# Patient Record
Sex: FEMALE | Race: WHITE | NOT HISPANIC OR LATINO | Employment: OTHER | URBAN - NONMETROPOLITAN AREA
[De-identification: names, ages, dates, MRNs, and addresses within clinical notes are randomized per-mention and may not be internally consistent; named-entity substitution may affect disease eponyms.]

---

## 2019-06-24 NOTE — PATIENT INSTRUCTIONS
"We want your colonoscopy to be as pleasant as possible. Please review the instructions below. If you have questions, you may contact us at any of the following numbers:     [facility] Coordinator: [phone]  Clinic Nurse: [phone]  Surgery Education Nurse: [phone]    Date of Procedure: 6/28/19 with Dr. Woodard  Admit time: Surgery Department will call you the day before your procedure by 5pm with your admit time. If your surgery is on Monday, expect a call on Friday.  If you are not contacted by 5PM, please call admitting at [phone]. After hours or on weekends, call [phone] to postpone.     Call the surgery nurse if you become ill within 1 week of your procedure to reschedule.   Lab needed today.    7 DAYS BEFORE THE EXAM (start now):  Fill your prescription(s) at the pharmacy as soon as possible.  Call the Surgery Education Nurse at [phone] and have a medication list ready.  No Aspirin or NSAIDS (Ibuprofen, Celebrex, Naproxen, etc) 7 days before surgery.   Stop fiber supplements, vitamins, iron and herbals. Do not eat corn, nuts or seeds.  If you are prescribed a daily 81 mg Aspirin, you may continue this.   Arrange transportation with a responsible adult or you will be be cancelled.    2 DAYS BEFORE THE EXAM (6/26/19):   Follow a low fiber diet. See the list of low fiber foods on page 3 of the \"Split-Dose Golytely\" packet. Nothing red or purple. Drink 4-6 large glasses of sports drink today and tomorrow.          AT BEDTIME: take 2 Dulcolax tablets.     1 DAY BEFORE THE EXAM (6/27/19):  No solid foods or milk products after 12:01 am. Drink only clear liquids all day. See the list of clear liquids on page 2 of the \"Split-Dose Golytely\" packet. No red, purple, or alcohol.         AT 3:00 PM: Take 2 Dulcolax tablets.         AT 6:00 PM: Drink one 8 oz. glass of Golytely every 10-15 minutes until the jug is half empty. Drink each glass quickly. Store the rest in the refrigerator. Stay " near a toilet.    DAY OF COLONOSCOPY PROCEDURE (6/28/19):           6 HOURS PRIOR TO THE EXAM (set an alarm):  Drink the other half of the Golytely jug. Drink one 8 oz glass every 10-15 minutes until gone. You may have clear liquids up until 3 hours before your exam. If you must take medicine, you may take it with a sip of water.   Shower. Wear comfortable clothes. No jewelry, make-up, nail polish, hair spray, lotions, or perfumes. [facility] in Admitting through the [place] [facility].  You must have a responsible adult available to drive and stay with you for 4 hours at home or you will be cancelled.     TIPS FOR COLON CLEANSING BEFORE YOUR COLONOSCOPY  To get accurate results from your exam, your colon must be completely empty or you may need to repeat the colon prep and exam. If you followed instructions and your stool is clear or yellow liquid, you are ready. If you are not sure if your colon is clean, please call the clinic nurse.    You may use Tucks wipes, hemorrhoid treatments, hydrocortisone cream or alcohol-free baby wipes to ease anal irritation. You may also use Vaseline to help protect the skin.     You can squeeze some lemon juice or add a packet of Crystal Light lemon-lime or ice tea flavor into your preparation. You may try sucking on hard candy or a lemon or lime wedge; or washing your mouth out with water, clear soda or mouthwash.    To chill the solution, put it in your refrigerator or set it in a bowl of ice. Do not add ice in your glass. Remove from the refrigerator 15 minutes before drinking.    Quickly drink each glass. It may help to use a timer. If the liquid is too salty, use a straw. Even when you are sitting on the toilet, keep drinking every 10-15 minutes. If you have nausea or vomiting, take a break for 15 to 30 minutes and then resume drinking.    You will have loose watery stools and may also have chills. Dress for comfort. Expect to feel bloating, nausea and other discomfort until the  stool clears from your colon.

## 2019-06-25 ENCOUNTER — OFFICE VISIT (OUTPATIENT)
Dept: SURGERY | Facility: OTHER | Age: 79
End: 2019-06-25
Attending: SURGERY
Payer: MEDICARE

## 2019-06-25 VITALS
WEIGHT: 183 LBS | HEART RATE: 60 BPM | SYSTOLIC BLOOD PRESSURE: 116 MMHG | HEIGHT: 61 IN | OXYGEN SATURATION: 95 % | TEMPERATURE: 97.2 F | DIASTOLIC BLOOD PRESSURE: 72 MMHG | BODY MASS INDEX: 34.55 KG/M2

## 2019-06-25 DIAGNOSIS — Z12.11 SPECIAL SCREENING FOR MALIGNANT NEOPLASMS, COLON: Primary | ICD-10-CM

## 2019-06-25 DIAGNOSIS — I10 ESSENTIAL HYPERTENSION: ICD-10-CM

## 2019-06-25 PROBLEM — M85.80 OSTEOPENIA: Status: ACTIVE | Noted: 2019-06-25

## 2019-06-25 PROBLEM — M16.12 PRIMARY OSTEOARTHRITIS OF LEFT HIP: Status: ACTIVE | Noted: 2019-05-16

## 2019-06-25 PROBLEM — E66.811 OBESITY (BMI 30.0-34.9): Status: ACTIVE | Noted: 2017-05-08

## 2019-06-25 LAB
ANION GAP SERPL CALCULATED.3IONS-SCNC: 4 MMOL/L (ref 3–14)
BUN SERPL-MCNC: 18 MG/DL (ref 7–30)
CALCIUM SERPL-MCNC: 8.6 MG/DL (ref 8.5–10.1)
CHLORIDE SERPL-SCNC: 105 MMOL/L (ref 94–109)
CO2 SERPL-SCNC: 29 MMOL/L (ref 20–32)
CREAT SERPL-MCNC: 0.95 MG/DL (ref 0.52–1.04)
GFR SERPL CREATININE-BSD FRML MDRD: 57 ML/MIN/{1.73_M2}
GLUCOSE SERPL-MCNC: 104 MG/DL (ref 70–99)
POTASSIUM SERPL-SCNC: 4.1 MMOL/L (ref 3.4–5.3)
SODIUM SERPL-SCNC: 138 MMOL/L (ref 133–144)

## 2019-06-25 PROCEDURE — 80048 BASIC METABOLIC PNL TOTAL CA: CPT | Mod: ZL | Performed by: SURGERY

## 2019-06-25 PROCEDURE — G0463 HOSPITAL OUTPT CLINIC VISIT: HCPCS

## 2019-06-25 PROCEDURE — 99204 OFFICE O/P NEW MOD 45 MIN: CPT | Performed by: SURGERY

## 2019-06-25 PROCEDURE — 36415 COLL VENOUS BLD VENIPUNCTURE: CPT | Mod: ZL | Performed by: SURGERY

## 2019-06-25 PROCEDURE — 93010 ELECTROCARDIOGRAM REPORT: CPT | Performed by: INTERNAL MEDICINE

## 2019-06-25 PROCEDURE — G0463 HOSPITAL OUTPT CLINIC VISIT: HCPCS | Mod: 25

## 2019-06-25 PROCEDURE — 93005 ELECTROCARDIOGRAM TRACING: CPT

## 2019-06-25 RX ORDER — BISACODYL 5 MG/1
TABLET, DELAYED RELEASE ORAL
Qty: 4 TABLET | Refills: 0 | Status: ON HOLD | OUTPATIENT
Start: 2019-06-25 | End: 2019-06-28

## 2019-06-25 ASSESSMENT — PAIN SCALES - GENERAL: PAINLEVEL: MODERATE PAIN (5)

## 2019-06-25 ASSESSMENT — MIFFLIN-ST. JEOR: SCORE: 1234.52

## 2019-06-25 NOTE — NURSING NOTE
"Chief Complaint   Patient presents with     Consult For     screening colonoscopy. Referred by Dr. Lambert       Initial /72   Pulse 60   Temp 97.2  F (36.2  C)   Ht 1.537 m (5' 0.5\")   Wt 83 kg (183 lb)   SpO2 95%   BMI 35.15 kg/m   Estimated body mass index is 35.15 kg/m  as calculated from the following:    Height as of this encounter: 1.537 m (5' 0.5\").    Weight as of this encounter: 83 kg (183 lb).  Medication Reconciliation: complete    JULIANN HSIEH LPN         "

## 2019-06-25 NOTE — PROGRESS NOTES
[facility] Surgery Consultation    CC:  Colorectal cancer screening    HPI:  This 79 year old year old female is seen at the request of Rodrigo Lambert for evaluation of colorectal cancer screening.  The history is obtained from the patient, and reviewing the medical record.  She is good medical historian. She has undergone multiple previous colonoscopies. Per our records she has had a colonoscopy done in  and then again in  due to large tubular adenomas. She then underwent a repeat colonoscopy in  with Dr. Lambert with tubular adenomas noted. She has a family history of colon cancer with her sister who was diagnosed in her 60s. She has constipation that is controlled with prunes daily. She has had no melena or hematochezia. She has had no abdominal pain, bloating or cramping. She has no upper GI symptoms.     Past Medical History:   Diagnosis Date     PONV (postoperative nausea and vomiting)        Past Surgical History:   Procedure Laterality Date     COLONOSCOPY      Fausto; polyps     REPAIR HAMMER TOE Left 2016    Procedure: REPAIR HAMMER TOE;  Surgeon: Celso De La Cruz DPM;  Location: [facility]     REPAIR HAMMER TOE Right 2016    Procedure: REPAIR HAMMER TOE;  Surgeon: Celso De La Cruz DPM;  Location: [facility]       Family History   Problem Relation Age of Onset     Colon Cancer Sister         diagnosed at age 62     Leukemia Brother      Liver Cancer Brother      Bone Cancer Brother      Heart Disease Brother        Social History     Tobacco Use     Smoking status: Former Smoker     Types: Cigarettes     Last attempt to quit: 1989     Years since quittin.4     Smokeless tobacco: Former User   Substance Use Topics     Alcohol use: Not Currently     Drug use: Not Currently       Prior to Admission medications    Medication Sig Start Date End Date Taking? Authorizing Provider   AMLODIPINE BESYLATE PO Take 5 mg by mouth daily    Reported, Patient   ASPIRIN ADULT LOW STRENGTH PO  "Take 81 mg by mouth daily    Reported, Patient   ATORVASTATIN CALCIUM PO Take 10 mg by mouth daily    Reported, Patient   METOPROLOL TARTRATE PO Take 25 mg by mouth daily    Reported, Patient   oxybutynin (DITROPAN-XL) 10 MG 24 hr tablet Take by mouth daily    Reported, Patient       Pt denied problems with bleeding or anesthesia  No mood altering drug use.     No Known Allergies    REVIEW OF SYSTEMS:  Ten point review of systems negative except those mentioned in the HPI.     The patient denies sleep apnea, latex allergies or MRSA    OBJECTIVE:    /72   Pulse 60   Temp 97.2  F (36.2  C)   Ht 1.537 m (5' 0.5\")   Wt 83 kg (183 lb)   SpO2 95%   BMI 35.15 kg/m      GENERAL: Generally appears well, in no distress with appropriate affect.  HEENT:   Sclerae anicteric - No cervical, supra/infraclavicular lymphadenopathy  Respiratory:  Lungs clear to ausculation bilaterally with good air excursion  Cardiovascular:  Regular Rate and Rhythm with no murmurs gallops or rubs, normal   Abdomen: soft, non-tender, non-distended  :  deferred  Extremities:  Extremities normal. No deformities, edema, or skin discoloration.  Skin:  no suspicious lesions or rashes  Neurological: grossly intact  Psych:  Alert, oriented, affect appropriate with normal decision making ability.      IMPRESSION:  78 yo female for colorectal cancer screening    PLAN:  A detailed description of the [place] Preventive Task Force development of colorectal cancer screening was had. I described the pathology of the adenoma to carcinoma progression and its genetic changes that occur. I discussed how with colorectal cancer screening by endoscopic surveillance we are able to identify potential malignancies and remove them before they progress along the adenoma to carcinoma pathway. The risks for colon cancer progression were discussed including first degree relatives with colon cancer, inflammatory bowel disease, smoking, obesity, and diet. I then " discussed how there are certain attributes which can decrease the risk of colon cancer such as a healthy diet and physical activity. The patient understood the adenoma to carcinoma sequence, the reasoning for screening at specific intervals, and risk factor modification. I then described the technical portion of the procedure.    The indications, risks, benefits and technical aspects of whole colon colonoscopy were outlined with risks including, but not limited to, perforation, bleeding and inability to visualize entire colon.  Management of each was reviewed.  The need of mechanical preparation of the colon was reviewed along with the use of monitored anesthetic care.  The patient's questions were asked and answered.  Scheduled first available date.    Thank you for allowing me to participate in the care of your patient.       Wero Woodard MD    6/25/2019  1:16 PM    cc:  Rodrigo Lambert

## 2019-06-26 ENCOUNTER — ANESTHESIA EVENT (OUTPATIENT)
Dept: SURGERY | Facility: HOSPITAL | Age: 79
End: 2019-06-26
Payer: MEDICARE

## 2019-06-26 ASSESSMENT — LIFESTYLE VARIABLES: TOBACCO_USE: 1

## 2019-06-26 NOTE — ANESTHESIA PREPROCEDURE EVALUATION
Anesthesia Pre-Procedure Evaluation    Patient: Tenisha Nelson   MRN: [id number] : 1940          Preoperative Diagnosis: SCREENING    Procedure(s):  COLONOSCOPY    Past Medical History:   Diagnosis Date     PONV (postoperative nausea and vomiting)      Past Surgical History:   Procedure Laterality Date     COLONOSCOPY      Lambert; polyps     REPAIR HAMMER TOE Left 2016    Procedure: REPAIR HAMMER TOE;  Surgeon: Celso De La Cruz DPM;  Location: [facility]     REPAIR HAMMER TOE Right 2016    Procedure: REPAIR HAMMER TOE;  Surgeon: Celso De La Cruz DPM;  Location: [facility]       Anesthesia Evaluation     . Pt has had prior anesthetic.     History of anesthetic complications   - PONV        ROS/MED HX    ENT/Pulmonary:     (+)ILIA risk factors (BMI: 35.15) hypertension, obese, tobacco use, Past use , . .    Neurologic:  - neg neurologic ROS     Cardiovascular:     (+) Dyslipidemia, hypertension-range: beta blocker, ---. : . . . :. .       METS/Exercise Tolerance:     Hematologic:  - neg hematologic  ROS       Musculoskeletal:   (+) arthritis,  other musculoskeletal- acquired spondylolisthesis      GI/Hepatic:     (+) bowel prep,       Renal/Genitourinary:     (+) chronic renal disease (stage 3), type: CRI,       Endo:     (+) Obesity (BMI 35), .      Psychiatric:  - neg psychiatric ROS       Infectious Disease:  - neg infectious disease ROS       Malignancy:      - no malignancy   Other:    - neg other ROS                      Physical Exam      Airway   Mallampati: II  TM distance: >3 FB  Neck ROM: full    Dental   (+) chipped and missing    Cardiovascular   Rhythm and rate: regular and normal      Pulmonary    breath sounds clear to auscultation            Lab Results   Component Value Date     2019    POTASSIUM 4.1 2019    CHLORIDE 105 2019    CO2 29 2019    BUN 18 2019    CR 0.95 2019     (H) 2019    RODOLFO 8.6 2019       Preop Vitals  BP Readings  "from Last 3 Encounters:   06/25/19 116/72   08/01/16 123/72   05/09/16 (!) 152/111    Pulse Readings from Last 3 Encounters:   06/25/19 60   08/01/16 72      Resp Readings from Last 3 Encounters:   08/01/16 18   05/09/16 16    SpO2 Readings from Last 3 Encounters:   06/25/19 95%   08/01/16 95%   05/09/16 96%      Temp Readings from Last 1 Encounters:   06/25/19 97.2  F (36.2  C)    Ht Readings from Last 1 Encounters:   06/25/19 1.537 m (5' 0.5\")      Wt Readings from Last 1 Encounters:   06/25/19 83 kg (183 lb)    Estimated body mass index is 35.15 kg/m  as calculated from the following:    Height as of 6/25/19: 1.537 m (5' 0.5\").    Weight as of 6/25/19: 83 kg (183 lb).       Anesthesia Plan      History & Physical Review  History and physical reviewed and following examination; no interval change.    ASA Status:  3 .    NPO Status:  > 8 hours    Plan for MAC with Intravenous and Propofol induction. Maintenance will be TIVA.  Reason for MAC:  Chronic cardiopulmonary disease (G9) and Other - see comments  PONV prophylaxis:  Ondansetron (or other 5HT-3)  Consult note Yamilet 6/25  Surgeon requests deep sedation. Patient is an ASA 3 and has advanced age >70. Will provide MAC.      Postoperative Care  Postoperative pain management:  IV analgesics.      Consents  Anesthetic plan, risks, benefits and alternatives discussed with:  Patient..                 RICKEY Tucker CRNA  "

## 2019-06-28 ENCOUNTER — ANESTHESIA (OUTPATIENT)
Dept: SURGERY | Facility: HOSPITAL | Age: 79
End: 2019-06-28
Payer: MEDICARE

## 2019-06-28 ENCOUNTER — HOSPITAL ENCOUNTER (OUTPATIENT)
Facility: HOSPITAL | Age: 79
Discharge: HOME | End: 2019-06-28
Attending: SURGERY | Admitting: SURGERY
Payer: MEDICARE

## 2019-06-28 VITALS — SYSTOLIC BLOOD PRESSURE: 113 MMHG | TEMPERATURE: 97.4 F | DIASTOLIC BLOOD PRESSURE: 68 MMHG | OXYGEN SATURATION: 94 %

## 2019-06-28 PROCEDURE — 45385 COLONOSCOPY W/LESION REMOVAL: CPT | Mod: PT | Performed by: SURGERY

## 2019-06-28 PROCEDURE — 71000027 ZZH RECOVERY PHASE 2 EACH 15 MINS: Performed by: SURGERY

## 2019-06-28 PROCEDURE — 40000306 ZZH STATISTIC PRE PROC ASSESS II: Performed by: SURGERY

## 2019-06-28 PROCEDURE — 88305 TISSUE EXAM BY PATHOLOGIST: CPT | Mod: TC | Performed by: SURGERY

## 2019-06-28 PROCEDURE — 25000125 ZZHC RX 250: Performed by: NURSE ANESTHETIST, CERTIFIED REGISTERED

## 2019-06-28 PROCEDURE — 37000008 ZZH ANESTHESIA TECHNICAL FEE, 1ST 30 MIN: Performed by: SURGERY

## 2019-06-28 PROCEDURE — 99100 ANES PT EXTEME AGE<1 YR&>70: CPT | Performed by: NURSE ANESTHETIST, CERTIFIED REGISTERED

## 2019-06-28 PROCEDURE — 36000050 ZZH SURGERY LEVEL 2 1ST 30 MIN: Performed by: SURGERY

## 2019-06-28 PROCEDURE — 25800030 ZZH RX IP 258 OP 636: Performed by: NURSE ANESTHETIST, CERTIFIED REGISTERED

## 2019-06-28 PROCEDURE — 27210794 ZZH OR GENERAL SUPPLY STERILE: Performed by: SURGERY

## 2019-06-28 PROCEDURE — 45385 COLONOSCOPY W/LESION REMOVAL: CPT | Performed by: ANESTHESIOLOGY

## 2019-06-28 PROCEDURE — 25000128 H RX IP 250 OP 636: Performed by: NURSE ANESTHETIST, CERTIFIED REGISTERED

## 2019-06-28 PROCEDURE — 45385 COLONOSCOPY W/LESION REMOVAL: CPT | Performed by: NURSE ANESTHETIST, CERTIFIED REGISTERED

## 2019-06-28 RX ORDER — ONDANSETRON 2 MG/ML
4 INJECTION INTRAMUSCULAR; INTRAVENOUS EVERY 30 MIN PRN
Status: DISCONTINUED | OUTPATIENT
Start: 2019-06-28 | End: 2019-06-28 | Stop reason: HOSPADM

## 2019-06-28 RX ORDER — FLUMAZENIL 0.1 MG/ML
0.2 INJECTION, SOLUTION INTRAVENOUS
Status: DISCONTINUED | OUTPATIENT
Start: 2019-06-28 | End: 2019-06-28 | Stop reason: HOSPADM

## 2019-06-28 RX ORDER — LIDOCAINE 40 MG/G
CREAM TOPICAL
Status: DISCONTINUED | OUTPATIENT
Start: 2019-06-28 | End: 2019-06-28 | Stop reason: HOSPADM

## 2019-06-28 RX ORDER — SODIUM CHLORIDE, SODIUM LACTATE, POTASSIUM CHLORIDE, CALCIUM CHLORIDE 600; 310; 30; 20 MG/100ML; MG/100ML; MG/100ML; MG/100ML
INJECTION, SOLUTION INTRAVENOUS CONTINUOUS
Status: DISCONTINUED | OUTPATIENT
Start: 2019-06-28 | End: 2019-06-28 | Stop reason: HOSPADM

## 2019-06-28 RX ORDER — NALOXONE HYDROCHLORIDE 0.4 MG/ML
.1-.4 INJECTION, SOLUTION INTRAMUSCULAR; INTRAVENOUS; SUBCUTANEOUS
Status: DISCONTINUED | OUTPATIENT
Start: 2019-06-28 | End: 2019-06-28 | Stop reason: HOSPADM

## 2019-06-28 RX ORDER — LIDOCAINE HYDROCHLORIDE 20 MG/ML
INJECTION, SOLUTION INFILTRATION; PERINEURAL PRN
Status: DISCONTINUED | OUTPATIENT
Start: 2019-06-28 | End: 2019-06-28

## 2019-06-28 RX ORDER — MEPERIDINE HYDROCHLORIDE 50 MG/ML
12.5 INJECTION INTRAMUSCULAR; INTRAVENOUS; SUBCUTANEOUS
Status: DISCONTINUED | OUTPATIENT
Start: 2019-06-28 | End: 2019-06-28 | Stop reason: HOSPADM

## 2019-06-28 RX ORDER — ONDANSETRON 4 MG/1
4 TABLET, ORALLY DISINTEGRATING ORAL EVERY 30 MIN PRN
Status: DISCONTINUED | OUTPATIENT
Start: 2019-06-28 | End: 2019-06-28 | Stop reason: HOSPADM

## 2019-06-28 RX ORDER — PROPOFOL 10 MG/ML
INJECTION, EMULSION INTRAVENOUS PRN
Status: DISCONTINUED | OUTPATIENT
Start: 2019-06-28 | End: 2019-06-28

## 2019-06-28 RX ADMIN — PROPOFOL 30 MG: 10 INJECTION, EMULSION INTRAVENOUS at 08:43

## 2019-06-28 RX ADMIN — PROPOFOL 30 MG: 10 INJECTION, EMULSION INTRAVENOUS at 08:45

## 2019-06-28 RX ADMIN — PROPOFOL 30 MG: 10 INJECTION, EMULSION INTRAVENOUS at 08:34

## 2019-06-28 RX ADMIN — LIDOCAINE HYDROCHLORIDE 40 MG: 20 INJECTION, SOLUTION INFILTRATION; PERINEURAL at 08:34

## 2019-06-28 RX ADMIN — PROPOFOL 20 MG: 10 INJECTION, EMULSION INTRAVENOUS at 08:48

## 2019-06-28 RX ADMIN — SODIUM CHLORIDE, POTASSIUM CHLORIDE, SODIUM LACTATE AND CALCIUM CHLORIDE: 600; 310; 30; 20 INJECTION, SOLUTION INTRAVENOUS at 08:00

## 2019-06-28 RX ADMIN — PROPOFOL 30 MG: 10 INJECTION, EMULSION INTRAVENOUS at 08:40

## 2019-06-28 RX ADMIN — PROPOFOL 30 MG: 10 INJECTION, EMULSION INTRAVENOUS at 08:36

## 2019-06-28 RX ADMIN — PROPOFOL 20 MG: 10 INJECTION, EMULSION INTRAVENOUS at 08:55

## 2019-06-28 RX ADMIN — PROPOFOL 20 MG: 10 INJECTION, EMULSION INTRAVENOUS at 08:52

## 2019-06-28 NOTE — OP NOTE
Tenisha Nelson MRN# [id number]   YOB: 1940 Age: 79 year old      Date of Admission:  6/28/2019    Primary care provider: Rodrigo Lambert    PREOPERATIVE DIAGNOSIS:  Screening colonoscopy.         POSTOPERATIVE DIAGNOSIS:  Transverse colon polyp x 2         PROCEDURE:  Whole colon colonoscopy with hot snare polypectomy.         INDICATIONS:  This 79 year old female presents for interval screening colonoscopy.        OPERATIVE FINDINGS:  There were two polyps identified in the transverse colon.          DESCRIPTION OF PROCEDURE:  With the patient in the supine position on the transport cart, IV sedation was administered by the nurse anesthetist.  Her correct identity and planned procedure were confirmed during the requisite timeout pause and he was rolled to the left lateral position.  The anus was digitally dilated and the fiberoptic colonoscope was introduced and negotiated through the length of the colon to the cecal base.  The cecum was intubated and its landmarks clearly identified.  A circumferential examination of the mucosa on introduction of the colonoscope and on its slow withdrawal confirmed the absence of neoplasia, inflammation and/or stricture.  In the transverse colon there were two polyps identified. These were removed with hot snare polypectomy and retrieved. The areas were inspected and hemostasis maintained. There were scattered diverticuli noted.  Retroflex in the rectal ampulla showed no evidence of pathology.  Air was aspirated and the colonoscope was withdrawn; the patient was returned to day surgery in good condition, without suggestion of complication.   The post surgical debriefing was held and acknowledged at completion.          Wero Woodard MD     6/28/2019 9:00 AM

## 2019-06-28 NOTE — ANESTHESIA POSTPROCEDURE EVALUATION
Patient: Tenisha Nelson    Procedure(s):  COLONOSCOPY with Polypectomy    Diagnosis:SCREENING  Diagnosis Additional Information: No value filed.    Anesthesia Type:  MAC    Note:  Anesthesia Post Evaluation    Patient location during evaluation: Phase 2 and Bedside  Patient participation: Able to fully participate in evaluation  Level of consciousness: awake and alert  Pain management: adequate  Airway patency: patent  Cardiovascular status: acceptable  Respiratory status: acceptable  Hydration status: stable  PONV: none     Anesthetic complications: None          Last vitals:  Vitals:    06/28/19 0915 06/28/19 0920 06/28/19 0925   BP: 99/64 (!) 88/61 113/68   Temp:      SpO2: 94% 93% 94%         Electronically Signed By: Stalin Dowling MD  June 28, 2019  9:32 AM

## 2019-06-28 NOTE — DISCHARGE INSTRUCTIONS

## 2019-06-28 NOTE — ANESTHESIA CARE TRANSFER NOTE
Patient: Tenisha Nelson    Procedure(s):  COLONOSCOPY with Polypectomy    Diagnosis: SCREENING  Diagnosis Additional Information: No value filed.    Anesthesia Type:   MAC     Note:  Airway :Nasal Cannula  Patient transferred to:Phase II  Handoff Report: Identifed the Patient, Identified the Reponsible Provider, Reviewed the pertinent medical history, Discussed the surgical course, Reviewed Intra-OP anesthesia mangement and issues during anesthesia, Set expectations for post-procedure period and Allowed opportunity for questions and acknowledgement of understanding      Vitals: (Last set prior to Anesthesia Care Transfer)    CRNA VITALS  6/28/2019 0829 - 6/28/2019 0902      6/28/2019             Pulse:  69    Ht Rate:  67    SpO2:  92 %    Resp Rate (set):  8                Electronically Signed By: RICKEY Galan CRNA  June 28, 2019  9:02 AM

## 2019-07-01 LAB — COPATH REPORT: NORMAL

## 2020-02-25 DIAGNOSIS — H90.3 BILATERAL SENSORINEURAL HEARING LOSS: Primary | ICD-10-CM

## 2020-03-10 ENCOUNTER — OFFICE VISIT (OUTPATIENT)
Dept: OTOLARYNGOLOGY | Facility: OTHER | Age: 80
End: 2020-03-10
Attending: NURSE PRACTITIONER
Payer: MEDICARE

## 2020-03-10 ENCOUNTER — OFFICE VISIT (OUTPATIENT)
Dept: AUDIOLOGY | Facility: OTHER | Age: 80
End: 2020-03-10
Attending: AUDIOLOGIST
Payer: MEDICARE

## 2020-03-10 VITALS
HEART RATE: 58 BPM | RESPIRATION RATE: 18 BRPM | TEMPERATURE: 97.5 F | DIASTOLIC BLOOD PRESSURE: 54 MMHG | WEIGHT: 181 LBS | HEIGHT: 61 IN | BODY MASS INDEX: 34.17 KG/M2 | SYSTOLIC BLOOD PRESSURE: 124 MMHG | OXYGEN SATURATION: 95 %

## 2020-03-10 DIAGNOSIS — H90.3 SENSORINEURAL HEARING LOSS (SNHL) OF BOTH EARS: Primary | ICD-10-CM

## 2020-03-10 DIAGNOSIS — H90.3 BILATERAL SENSORINEURAL HEARING LOSS: ICD-10-CM

## 2020-03-10 DIAGNOSIS — H93.13 TINNITUS, BILATERAL: ICD-10-CM

## 2020-03-10 DIAGNOSIS — H90.3 ASNHL (ASYMMETRICAL SENSORINEURAL HEARING LOSS): Primary | ICD-10-CM

## 2020-03-10 PROCEDURE — 92550 TYMPANOMETRY & REFLEX THRESH: CPT | Performed by: AUDIOLOGIST

## 2020-03-10 PROCEDURE — G0463 HOSPITAL OUTPT CLINIC VISIT: HCPCS | Mod: 25

## 2020-03-10 PROCEDURE — 99213 OFFICE O/P EST LOW 20 MIN: CPT | Performed by: NURSE PRACTITIONER

## 2020-03-10 PROCEDURE — 92557 COMPREHENSIVE HEARING TEST: CPT | Performed by: AUDIOLOGIST

## 2020-03-10 ASSESSMENT — PAIN SCALES - GENERAL: PAINLEVEL: NO PAIN (0)

## 2020-03-10 ASSESSMENT — MIFFLIN-ST. JEOR: SCORE: 1233.39

## 2020-03-10 NOTE — PROGRESS NOTES
Audiology Evaluation Completed. Please refer SCANNED AUDIOGRAM and/or TYMPANOGRAM for BACKGROUND, RESULTS, RECOMMENDATIONS.      Jojo HAMEED, [facility]  Audiologist #[id number]

## 2020-03-10 NOTE — PROGRESS NOTES
Otolaryngology Note         Chief Complaint:     Patient presents with:  Tinnitus: Patient  here for ringing in ears           History of Present Illness:     Tenisha Nelson is a 79 year old female who presents today with a chief complaint of tinnitus.  She noticed it last fall 2019.  It came on gradually.  She notes it is all the time, louder at night.  It is not bothersome, does not keep her awake at night.  It is not pulsatile in nature.      She denies concerns for vertigo/dizziness  No fluctuating hearing loss  No history of trauma/head injury  No history of noise exposure  She has a family history of hearing loss/tinnitus    No change in medications  She denies  history of systemic diseases  No current/recurrent history of infections  No history of otological surgery  No current otalgia/ottorhea  No jaw pain/TMJ  Baby ASA daily no other ASA or NSAIDS  3-4 cups coffee in morning, occasional in the afternoon.    No ETOH  No tobacco, quit smoking about 30 years ago  No significant stress    Audiogram 3/10/2020  Tympanograms are Type A for both ears suggesting normal eardrum mobility.  Acoustic Reflex Thresholds at 1000 Hz are present for both ears.  Thresholds are normal to severe sensorineuralhearing loss with slight asymmetry.  Speech reception thresholds are in good agreement with pure tone average.  Word discrimination scores are excellent at supra-thresholds level/MCL.         Medications:     Current Outpatient Rx   Medication Sig Dispense Refill     AMLODIPINE BESYLATE PO Take 5 mg by mouth daily       ASPIRIN ADULT LOW STRENGTH PO Take 81 mg by mouth daily       ATORVASTATIN CALCIUM PO Take 10 mg by mouth daily       METOPROLOL TARTRATE PO Take 25 mg by mouth daily       oxybutynin (DITROPAN-XL) 10 MG 24 hr tablet Take by mouth daily              Allergies:     Allergies: Patient has no known allergies.          Past Medical History:     Past Medical History:   Diagnosis Date     PONV (postoperative  "nausea and vomiting)             Past Surgical History:     Past Surgical History:   Procedure Laterality Date     COLONOSCOPY      Lambert; polyps     COLONOSCOPY N/A 2019    Procedure: COLONOSCOPY with Polypectomy;  Surgeon: Wero Woodard MD;  Location: [facility]     REPAIR HAMMER TOE Left 2016    Procedure: REPAIR HAMMER TOE;  Surgeon: Celso De La Cruz DPM;  Location: [facility]     REPAIR HAMMER TOE Right 2016    Procedure: REPAIR HAMMER TOE;  Surgeon: Celso De La Cruz DPM;  Location: [facility]       ENT family history reviewed         Social History:     Social History     Tobacco Use     Smoking status: Former Smoker     Types: Cigarettes     Last attempt to quit: 1989     Years since quittin.2     Smokeless tobacco: Former User   Substance Use Topics     Alcohol use: Not Currently     Drug use: Not Currently            Review of Systems:     ROS: See HPI         Physical Exam:     /54 (BP Location: Right arm)   Pulse 58   Temp 97.5  F (36.4  C) (Tympanic)   Resp 18   Ht 1.549 m (5' 1\")   Wt 82.1 kg (181 lb)   SpO2 95%   BMI 34.20 kg/m      General - The patient is well nourished and well developed, and appears to have good nutritional status.  Alert and oriented to person and place, answers questions and cooperates with examination appropriately.   Head and Face - Normocephalic and atraumatic, with no gross asymmetry noted.  The facial nerve is intact, with strong symmetric movements.  Voice and Breathing - The patient was breathing comfortably without the use of accessory muscles. There was no wheezing, stridor. The patients voice was clear and strong, and had appropriate pitch and quality.  Ears - External ear normal. Canals are patent. Right tympanic membrane is intact without effusion, retraction or mass. Left tympanic membrane is intact without effusion, retraction or mass.  Eyes - Extraocular movements intact, and the pupils were reactive to light. Sclera were not icteric " or injected, conjunctiva were pink and moist.  Mouth - Examination of the oral cavity showed pink, healthy oral mucosa. Dentition in good condition. No lesions or ulcerations noted. The tongue was mobile and midline.   Throat - The walls of the oropharynx were smooth, pink, moist, symmetric, and had no lesions or ulcerations.  The tonsillar pillars and soft palate were symmetric. The uvula was midline on elevation.    Neck - Normal midline excursion of the laryngotracheal complex during swallowing.  Full range of motion on passive movement.  Palpation of the occipital, submental, submandibular, internal jugular chain, and supraclavicular nodes did not demonstrate any abnormal lymph nodes or masses.  Palpation of the thyroid was soft and smooth, with no nodules or goiter appreciated.  The trachea was mobile and midline.  Nose - External contour is symmetric, no gross deflection or scars.  Nasal mucosa is pink and moist with no abnormal mucus.  The septum and turbinates were evaluated: grossly normal.  No polyps, masses, or purulence noted on examination.         Assessment and Plan:       ICD-10-CM    1. ASNHL (asymmetrical sensorineural hearing loss)  H90.5 MR Internal Auditory Canal wo&w Contrast   2. Tinnitus, bilateral  H93.13 MR Internal Auditory Canal wo&w Contrast       We spent the remainder of today's visit discussing the current leading theory on tinnitus, in that it originates from the central nervous system itself, similar to Phantom Limb Syndrome.  Also discussed were steps that can be taken to mask the noise, such as a low volume de-tuned radio, a fan in the background, and hearing aids.  Correlation with stress, anxiety, depression, and high blood pressure were also discussed.  The patient was also cautioned on the numerous expensive non-pharmaceutical options that are advertised, and have no proven benefit.    The patient will follow up as necessary for worsening symptoms, changes in symptoms, or  signs of infection.  I have also recommended yearly audiograms, and consideration of a hearing aid evaluation.    MRI IAC    Li GARCÍA  [facility]  12:45 PM  March 10, 2020

## 2020-03-10 NOTE — NURSING NOTE
"Chief Complaint   Patient presents with     Tinnitus     Patient  here for ringing in ears, audio done 2/25/2020       Initial /54 (BP Location: Right arm)   Pulse 58   Temp 97.5  F (36.4  C) (Tympanic)   Resp 18   Ht 1.549 m (5' 1\")   Wt 82.1 kg (181 lb)   SpO2 95%   BMI 34.20 kg/m   Estimated body mass index is 34.2 kg/m  as calculated from the following:    Height as of this encounter: 1.549 m (5' 1\").    Weight as of this encounter: 82.1 kg (181 lb).  Medication Reconciliation: complete  Jocelyne Burnett LPN  "

## 2020-03-10 NOTE — PATIENT INSTRUCTIONS
F/u with MRI        Thank you for allowing Li Blair NP and our ENT team to participate in your care.  If your medications are too expensive, please give the nurse a call.  We can possibly change this medication.  If you have a scheduling or an appointment question please contact our Health Unit Coordinator at their direct line [phone].   ALL nursing questions or concerns can be directed to your ENT nurse at: [phone] Dawood Casanova

## 2020-03-10 NOTE — LETTER
3/10/2020         RE: Tenisha Nelson  [address]  [address]  [facility] [address]        Dear Colleague,    Thank you for referring your patient, Tenisha Nelson, to the [facility]. Please see a copy of my visit note below.      Otolaryngology Note         Chief Complaint:     Patient presents with:  Tinnitus: Patient  here for ringing in ears           History of Present Illness:     Tenisha Nelson is a 79 year old female who presents today with a chief complaint of tinnitus.  She noticed it last fall 2019.  It came on gradually.  She notes it is all the time, louder at night.  It is not bothersome, does not keep her awake at night.  It is not pulsatile in nature.      She denies concerns for vertigo/dizziness  No fluctuating hearing loss  No history of trauma/head injury  No history of noise exposure  She has a family history of hearing loss/tinnitus    No change in medications  She denies  history of systemic diseases  No current/recurrent history of infections  No history of otological surgery  No current otalgia/ottorhea  No jaw pain/TMJ  Baby ASA daily no other ASA or NSAIDS  3-4 cups coffee in morning, occasional in the afternoon.    No ETOH  No tobacco, quit smoking about 30 years ago  No significant stress    Audiogram 3/10/2020  Tympanograms are Type A for both ears suggesting normal eardrum mobility.  Acoustic Reflex Thresholds at 1000 Hz are present for both ears.  Thresholds are normal to severe sensorineuralhearing loss with slight asymmetry.  Speech reception thresholds are in good agreement with pure tone average.  Word discrimination scores are excellent at supra-thresholds level/MCL.         Medications:     Current Outpatient Rx   Medication Sig Dispense Refill     AMLODIPINE BESYLATE PO Take 5 mg by mouth daily       ASPIRIN ADULT LOW STRENGTH PO Take 81 mg by mouth daily       ATORVASTATIN CALCIUM PO Take 10 mg by mouth daily       METOPROLOL TARTRATE PO Take 25 mg by  "mouth daily       oxybutynin (DITROPAN-XL) 10 MG 24 hr tablet Take by mouth daily              Allergies:     Allergies: Patient has no known allergies.          Past Medical History:     Past Medical History:   Diagnosis Date     PONV (postoperative nausea and vomiting)             Past Surgical History:     Past Surgical History:   Procedure Laterality Date     COLONOSCOPY      Lambert; polyps     COLONOSCOPY N/A 2019    Procedure: COLONOSCOPY with Polypectomy;  Surgeon: Wero Woodard MD;  Location: [facility]     REPAIR HAMMER TOE Left 2016    Procedure: REPAIR HAMMER TOE;  Surgeon: Celso De La Cruz DPM;  Location: [facility]     REPAIR HAMMER TOE Right 2016    Procedure: REPAIR HAMMER TOE;  Surgeon: Celso De La Cruz DPM;  Location: [facility]       ENT family history reviewed         Social History:     Social History     Tobacco Use     Smoking status: Former Smoker     Types: Cigarettes     Last attempt to quit: 1989     Years since quittin.2     Smokeless tobacco: Former User   Substance Use Topics     Alcohol use: Not Currently     Drug use: Not Currently            Review of Systems:     ROS: See HPI         Physical Exam:     /54 (BP Location: Right arm)   Pulse 58   Temp 97.5  F (36.4  C) (Tympanic)   Resp 18   Ht 1.549 m (5' 1\")   Wt 82.1 kg (181 lb)   SpO2 95%   BMI 34.20 kg/m      General - The patient is well nourished and well developed, and appears to have good nutritional status.  Alert and oriented to person and place, answers questions and cooperates with examination appropriately.   Head and Face - Normocephalic and atraumatic, with no gross asymmetry noted.  The facial nerve is intact, with strong symmetric movements.  Voice and Breathing - The patient was breathing comfortably without the use of accessory muscles. There was no wheezing, stridor. The patients voice was clear and strong, and had appropriate pitch and quality.  Ears - External ear normal. Canals are " patent. Right tympanic membrane is intact without effusion, retraction or mass. Left tympanic membrane is intact without effusion, retraction or mass.  Eyes - Extraocular movements intact, and the pupils were reactive to light. Sclera were not icteric or injected, conjunctiva were pink and moist.  Mouth - Examination of the oral cavity showed pink, healthy oral mucosa. Dentition in good condition. No lesions or ulcerations noted. The tongue was mobile and midline.   Throat - The walls of the oropharynx were smooth, pink, moist, symmetric, and had no lesions or ulcerations.  The tonsillar pillars and soft palate were symmetric. The uvula was midline on elevation.    Neck - Normal midline excursion of the laryngotracheal complex during swallowing.  Full range of motion on passive movement.  Palpation of the occipital, submental, submandibular, internal jugular chain, and supraclavicular nodes did not demonstrate any abnormal lymph nodes or masses.  Palpation of the thyroid was soft and smooth, with no nodules or goiter appreciated.  The trachea was mobile and midline.  Nose - External contour is symmetric, no gross deflection or scars.  Nasal mucosa is pink and moist with no abnormal mucus.  The septum and turbinates were evaluated: grossly normal.  No polyps, masses, or purulence noted on examination.         Assessment and Plan:       ICD-10-CM    1. ASNHL (asymmetrical sensorineural hearing loss)  H90.5 MR Internal Auditory Canal wo&w Contrast   2. Tinnitus, bilateral  H93.13 MR Internal Auditory Canal wo&w Contrast       We spent the remainder of today's visit discussing the current leading theory on tinnitus, in that it originates from the central nervous system itself, similar to Phantom Limb Syndrome.  Also discussed were steps that can be taken to mask the noise, such as a low volume de-tuned radio, a fan in the background, and hearing aids.  Correlation with stress, anxiety, depression, and high blood  pressure were also discussed.  The patient was also cautioned on the numerous expensive non-pharmaceutical options that are advertised, and have no proven benefit.    The patient will follow up as necessary for worsening symptoms, changes in symptoms, or signs of infection.  I have also recommended yearly audiograms, and consideration of a hearing aid evaluation.    MRI IAC    Li GARCÍA  [facility]  12:45 PM  March 10, 2020      Again, thank you for allowing me to participate in the care of your patient.        Sincerely,        Li Blair NP

## 2021-02-10 ENCOUNTER — IMMUNIZATION (OUTPATIENT)
Dept: FAMILY MEDICINE | Facility: OTHER | Age: 81
End: 2021-02-10
Payer: MEDICARE

## 2021-02-10 PROCEDURE — 91300 PR COVID VAC PFIZER DIL RECON 30 MCG/0.3 ML IM: CPT

## 2021-03-03 ENCOUNTER — IMMUNIZATION (OUTPATIENT)
Dept: FAMILY MEDICINE | Facility: OTHER | Age: 81
End: 2021-03-03
Attending: FAMILY MEDICINE
Payer: MEDICARE

## 2021-03-03 PROCEDURE — 91300 PR COVID VAC PFIZER DIL RECON 30 MCG/0.3 ML IM: CPT

## 2021-06-04 ENCOUNTER — APPOINTMENT (OUTPATIENT)
Dept: CT IMAGING | Facility: HOSPITAL | Age: 81
End: 2021-06-04
Attending: INTERNAL MEDICINE
Payer: MEDICARE

## 2021-06-04 ENCOUNTER — HOSPITAL ENCOUNTER (EMERGENCY)
Facility: HOSPITAL | Age: 81
Discharge: HOME | End: 2021-06-04
Attending: INTERNAL MEDICINE | Admitting: INTERNAL MEDICINE
Payer: MEDICARE

## 2021-06-04 ENCOUNTER — APPOINTMENT (OUTPATIENT)
Dept: GENERAL RADIOLOGY | Facility: HOSPITAL | Age: 81
End: 2021-06-04
Attending: INTERNAL MEDICINE
Payer: MEDICARE

## 2021-06-04 VITALS
OXYGEN SATURATION: 93 % | SYSTOLIC BLOOD PRESSURE: 117 MMHG | HEART RATE: 94 BPM | RESPIRATION RATE: 20 BRPM | DIASTOLIC BLOOD PRESSURE: 73 MMHG | TEMPERATURE: 99.1 F

## 2021-06-04 DIAGNOSIS — R07.9 CHEST PAIN, UNSPECIFIED TYPE: ICD-10-CM

## 2021-06-04 DIAGNOSIS — M54.2 NECK PAIN: ICD-10-CM

## 2021-06-04 LAB
ALBUMIN SERPL-MCNC: 3.2 G/DL (ref 3.4–5)
ALP SERPL-CCNC: 69 U/L (ref 40–150)
ALT SERPL W P-5'-P-CCNC: 18 U/L (ref 0–50)
ANION GAP SERPL CALCULATED.3IONS-SCNC: 7 MMOL/L (ref 3–14)
AST SERPL W P-5'-P-CCNC: 17 U/L (ref 0–45)
BASOPHILS # BLD AUTO: 0 10E9/L (ref 0–0.2)
BASOPHILS NFR BLD AUTO: 0.2 %
BILIRUB SERPL-MCNC: 0.5 MG/DL (ref 0.2–1.3)
BUN SERPL-MCNC: 14 MG/DL (ref 7–30)
CALCIUM SERPL-MCNC: 8.5 MG/DL (ref 8.5–10.1)
CHLORIDE SERPL-SCNC: 105 MMOL/L (ref 94–109)
CO2 SERPL-SCNC: 27 MMOL/L (ref 20–32)
CREAT SERPL-MCNC: 0.88 MG/DL (ref 0.52–1.04)
DIFFERENTIAL METHOD BLD: ABNORMAL
EOSINOPHIL # BLD AUTO: 0.1 10E9/L (ref 0–0.7)
EOSINOPHIL NFR BLD AUTO: 1 %
ERYTHROCYTE [DISTWIDTH] IN BLOOD BY AUTOMATED COUNT: 12.4 % (ref 10–15)
GFR SERPL CREATININE-BSD FRML MDRD: 62 ML/MIN/{1.73_M2}
GLUCOSE SERPL-MCNC: 144 MG/DL (ref 70–99)
HCT VFR BLD AUTO: 38.2 % (ref 35–47)
HGB BLD-MCNC: 12.8 G/DL (ref 11.7–15.7)
IMM GRANULOCYTES # BLD: 0 10E9/L (ref 0–0.4)
IMM GRANULOCYTES NFR BLD: 0.3 %
LYMPHOCYTES # BLD AUTO: 1.2 10E9/L (ref 0.8–5.3)
LYMPHOCYTES NFR BLD AUTO: 10.2 %
MCH RBC QN AUTO: 32.6 PG (ref 26.5–33)
MCHC RBC AUTO-ENTMCNC: 33.5 G/DL (ref 31.5–36.5)
MCV RBC AUTO: 97 FL (ref 78–100)
MONOCYTES # BLD AUTO: 0.7 10E9/L (ref 0–1.3)
MONOCYTES NFR BLD AUTO: 6.4 %
NEUTROPHILS # BLD AUTO: 9.2 10E9/L (ref 1.6–8.3)
NEUTROPHILS NFR BLD AUTO: 81.9 %
NRBC # BLD AUTO: 0 10*3/UL
NRBC BLD AUTO-RTO: 0 /100
PLATELET # BLD AUTO: 319 10E9/L (ref 150–450)
POTASSIUM SERPL-SCNC: 3.5 MMOL/L (ref 3.4–5.3)
PROT SERPL-MCNC: 7.7 G/DL (ref 6.8–8.8)
RBC # BLD AUTO: 3.93 10E12/L (ref 3.8–5.2)
SODIUM SERPL-SCNC: 139 MMOL/L (ref 133–144)
TROPONIN I SERPL-MCNC: <0.015 UG/L (ref 0–0.04)
WBC # BLD AUTO: 11.2 10E9/L (ref 4–11)

## 2021-06-04 PROCEDURE — 71275 CT ANGIOGRAPHY CHEST: CPT | Mod: ME

## 2021-06-04 PROCEDURE — 96375 TX/PRO/DX INJ NEW DRUG ADDON: CPT | Mod: XU

## 2021-06-04 PROCEDURE — 80053 COMPREHEN METABOLIC PANEL: CPT | Performed by: INTERNAL MEDICINE

## 2021-06-04 PROCEDURE — 36415 COLL VENOUS BLD VENIPUNCTURE: CPT

## 2021-06-04 PROCEDURE — 99285 EMERGENCY DEPT VISIT HI MDM: CPT | Mod: 25

## 2021-06-04 PROCEDURE — 250N000011 HC RX IP 250 OP 636: Performed by: INTERNAL MEDICINE

## 2021-06-04 PROCEDURE — 85025 COMPLETE CBC W/AUTO DIFF WBC: CPT | Performed by: INTERNAL MEDICINE

## 2021-06-04 PROCEDURE — 250N000013 HC RX MED GY IP 250 OP 250 PS 637: Performed by: INTERNAL MEDICINE

## 2021-06-04 PROCEDURE — 93010 ELECTROCARDIOGRAM REPORT: CPT | Performed by: INTERNAL MEDICINE

## 2021-06-04 PROCEDURE — 84484 ASSAY OF TROPONIN QUANT: CPT | Performed by: INTERNAL MEDICINE

## 2021-06-04 PROCEDURE — G1004 CDSM NDSC: HCPCS

## 2021-06-04 PROCEDURE — 71045 X-RAY EXAM CHEST 1 VIEW: CPT

## 2021-06-04 PROCEDURE — 93005 ELECTROCARDIOGRAM TRACING: CPT

## 2021-06-04 PROCEDURE — 99285 EMERGENCY DEPT VISIT HI MDM: CPT | Performed by: INTERNAL MEDICINE

## 2021-06-04 PROCEDURE — 96374 THER/PROPH/DIAG INJ IV PUSH: CPT | Mod: XU

## 2021-06-04 RX ORDER — MORPHINE SULFATE 2 MG/ML
4 INJECTION, SOLUTION INTRAMUSCULAR; INTRAVENOUS ONCE
Status: COMPLETED | OUTPATIENT
Start: 2021-06-04 | End: 2021-06-04

## 2021-06-04 RX ORDER — LIDOCAINE 4 G/G
1 PATCH TOPICAL EVERY 24 HOURS
Qty: 10 PATCH | Refills: 0 | Status: ON HOLD | OUTPATIENT
Start: 2021-06-04 | End: 2023-12-05

## 2021-06-04 RX ORDER — KETOROLAC TROMETHAMINE 15 MG/ML
15 INJECTION, SOLUTION INTRAMUSCULAR; INTRAVENOUS ONCE
Status: COMPLETED | OUTPATIENT
Start: 2021-06-04 | End: 2021-06-04

## 2021-06-04 RX ORDER — IOPAMIDOL 755 MG/ML
50 INJECTION, SOLUTION INTRAVASCULAR ONCE
Status: COMPLETED | OUTPATIENT
Start: 2021-06-04 | End: 2021-06-04

## 2021-06-04 RX ORDER — DIAZEPAM 5 MG
5 TABLET ORAL ONCE
Status: COMPLETED | OUTPATIENT
Start: 2021-06-04 | End: 2021-06-04

## 2021-06-04 RX ORDER — CYCLOBENZAPRINE HCL 10 MG
10 TABLET ORAL 3 TIMES DAILY PRN
Qty: 20 TABLET | Refills: 0 | Status: SHIPPED | OUTPATIENT
Start: 2021-06-04 | End: 2021-06-10

## 2021-06-04 RX ORDER — ONDANSETRON 2 MG/ML
4 INJECTION INTRAMUSCULAR; INTRAVENOUS ONCE
Status: COMPLETED | OUTPATIENT
Start: 2021-06-04 | End: 2021-06-04

## 2021-06-04 RX ORDER — IOPAMIDOL 755 MG/ML
100 INJECTION, SOLUTION INTRAVASCULAR ONCE
Status: COMPLETED | OUTPATIENT
Start: 2021-06-04 | End: 2021-06-04

## 2021-06-04 RX ADMIN — KETOROLAC TROMETHAMINE 15 MG: 15 INJECTION, SOLUTION INTRAMUSCULAR; INTRAVENOUS at 06:23

## 2021-06-04 RX ADMIN — MORPHINE SULFATE 4 MG: 2 INJECTION, SOLUTION INTRAMUSCULAR; INTRAVENOUS at 04:40

## 2021-06-04 RX ADMIN — IOPAMIDOL 100 ML: 755 INJECTION, SOLUTION INTRAVENOUS at 08:07

## 2021-06-04 RX ADMIN — IOPAMIDOL 50 ML: 755 INJECTION, SOLUTION INTRAVENOUS at 08:07

## 2021-06-04 RX ADMIN — ONDANSETRON 4 MG: 2 INJECTION INTRAMUSCULAR; INTRAVENOUS at 04:40

## 2021-06-04 RX ADMIN — DIAZEPAM 5 MG: 5 TABLET ORAL at 06:23

## 2021-06-04 ASSESSMENT — ENCOUNTER SYMPTOMS
LIGHT-HEADEDNESS: 0
HEADACHES: 0
ABDOMINAL DISTENTION: 0
NAUSEA: 0
ARTHRALGIAS: 0
WHEEZING: 0
MYALGIAS: 0
BLOOD IN STOOL: 0
FEVER: 0
CHEST TIGHTNESS: 0
COUGH: 0
VOMITING: 0
DIAPHORESIS: 0
HEMATURIA: 0
NECK STIFFNESS: 0
NUMBNESS: 0
CONFUSION: 0
ANAL BLEEDING: 0
WOUND: 0
NECK PAIN: 1
SHORTNESS OF BREATH: 0
CHILLS: 0
BACK PAIN: 1
DIZZINESS: 0
FLANK PAIN: 0
DYSURIA: 0
ABDOMINAL PAIN: 0
VOICE CHANGE: 0
COLOR CHANGE: 0
PALPITATIONS: 0

## 2021-06-04 NOTE — ED NOTES
Patient states that around 2100 while she was asleep, she turned in bed and had a sharp pain in left neck and then pain moved down across upper chest and into left shoulder area.  She states that she has been having neck issues for quite awhile. She denies SOB. Pain increases with palpation.

## 2021-06-04 NOTE — ED PROVIDER NOTES
Patient was signed out to me at change of shift by Dr. Odom reevaluation and follow-up on CT angiogram of the neck and chest. The studies were performed and I reviewed the radiologist interpretation which shows no evidence of acute life-threatening disease. I reviewed the additional work-up performed by the previous ED shift and find no concerning abnormalities. I reassessed the patient and her symptoms seem to be differently improved/resolved.    I think is reasonable for her to be discharged home with close follow-up with primary care at this time. Per previous discussion with Dr. Camargo, will be providing prescription for Flexeril will also prescribe some lidocaine patches.. Also recommend use of NSAIDs as tolerated. Plan of care is discussed with the patient, she indicates agreement and understanding. She subsequently discharged in stable condition with good prognosis    1. Neck pain    2. Chest pain, unspecified type           Gama Bhardwaj MD  06/04/21 1000

## 2021-06-04 NOTE — ED PROVIDER NOTES
History     Chief Complaint   Patient presents with     Chest Pain     The history is provided by the patient.   Chest Pain  Pain location:  L chest  Pain quality: sharp    Pain radiates to:  Neck  Pain severity:  Severe  Onset quality:  Gradual  Duration:  7 hours  Timing:  Constant  Progression:  Unchanged  Chronicity:  New  Context: movement and raising an arm    Associated symptoms: back pain    Associated symptoms: no abdominal pain, no cough, no diaphoresis, no dizziness, no fever, no headache, no nausea, no numbness, no palpitations, no shortness of breath and no vomiting          Allergies:  No Known Allergies    Problem List:    Patient Active Problem List    Diagnosis Date Noted     Osteopenia 06/25/2019     Priority: Medium     Primary osteoarthritis of left hip 05/16/2019     Priority: Medium     Obesity (BMI 30.0-34.9) 05/08/2017     Priority: Medium     Impaired fasting glucose 09/25/2012     Priority: Medium     Acquired spondylolisthesis 03/28/2012     Priority: Medium     IMO Update 10/11       CKD (chronic kidney disease) stage 3, GFR 30-59 ml/min 03/28/2011     Priority: Medium     IMO Update 10/11       Urinary incontinence 06/29/2009     Priority: Medium     IMO Update 10/11       Benign neoplasm of colon 05/27/2008     Priority: Medium     Family history of malignant neoplasm of gastrointestinal tract 02/16/2007     Priority: Medium     7/8/19: hyperplastic polyp x 2 on colonoscopy. Recommend a 5 year follow up colonoscopy due to her family history of colon cancer, however at her age she may discuss with her primary care provider about utility of a colonoscopy at 84.       Hyperlipidemia 02/16/2007     Priority: Medium     IMO Update 10/11       Essential hypertension 06/27/2003     Priority: Medium        Past Medical History:    Past Medical History:   Diagnosis Date     PONV (postoperative nausea and vomiting)        Past Surgical History:    Past Surgical History:   Procedure Laterality  Date     COLONOSCOPY      Lambert; polyps     COLONOSCOPY N/A 2019    Procedure: COLONOSCOPY with Polypectomy;  Surgeon: Wero Woodard MD;  Location: [facility]     REPAIR HAMMER TOE Left 2016    Procedure: REPAIR HAMMER TOE;  Surgeon: Celso De La Cruz DPM;  Location: [facility]     REPAIR HAMMER TOE Right 2016    Procedure: REPAIR HAMMER TOE;  Surgeon: Celso De La Cruz DPM;  Location: [facility]       Family History:    Family History   Problem Relation Age of Onset     Colon Cancer Sister         diagnosed at age 62     Leukemia Brother      Liver Cancer Brother      Bone Cancer Brother      Heart Disease Brother        Social History:  Marital Status:   [2]  Social History     Tobacco Use     Smoking status: Former Smoker     Types: Cigarettes     Quit date: 1989     Years since quittin.4     Smokeless tobacco: Former User   Substance Use Topics     Alcohol use: Not Currently     Drug use: Not Currently        Medications:    Lidocaine (LIDOCARE) 4 % Patch  METOPROLOL TARTRATE PO  oxybutynin (DITROPAN-XL) 10 MG 24 hr tablet  acetaminophen (TYLENOL) 325 MG tablet  apixaban ANTICOAGULANT (ELIQUIS) 5 MG tablet  benzonatate (TESSALON) 100 MG capsule  colchicine (COLCYRS) 0.6 MG tablet  ondansetron (ZOFRAN-ODT) 4 MG ODT tab          Review of Systems   Constitutional: Negative for chills, diaphoresis and fever.   HENT: Negative for voice change.    Eyes: Negative for visual disturbance.   Respiratory: Negative for cough, chest tightness, shortness of breath and wheezing.    Cardiovascular: Positive for chest pain. Negative for palpitations and leg swelling.   Gastrointestinal: Negative for abdominal distention, abdominal pain, anal bleeding, blood in stool, nausea and vomiting.   Genitourinary: Negative for decreased urine volume, dysuria, flank pain and hematuria.   Musculoskeletal: Positive for back pain and neck pain. Negative for arthralgias, gait problem, myalgias and neck stiffness.    Skin: Negative for color change, pallor, rash and wound.   Neurological: Negative for dizziness, syncope, light-headedness, numbness and headaches.   Psychiatric/Behavioral: Negative for confusion and suicidal ideas.       Physical Exam   BP: 138/60  Pulse: 70  Temp: 99.1  F (37.3  C)  Resp: 18  SpO2: 95 %      Physical Exam  Vitals signs and nursing note reviewed.   Constitutional:       Appearance: She is well-developed.   HENT:      Head: Normocephalic and atraumatic.      Mouth/Throat:      Pharynx: No oropharyngeal exudate.   Eyes:      Conjunctiva/sclera: Conjunctivae normal.      Pupils: Pupils are equal, round, and reactive to light.   Neck:      Musculoskeletal: Normal range of motion and neck supple. Muscular tenderness present.      Thyroid: No thyromegaly.      Vascular: No JVD.      Trachea: No tracheal deviation.     Cardiovascular:      Rate and Rhythm: Normal rate and regular rhythm.      Heart sounds: Normal heart sounds. No murmur. No friction rub. No gallop.    Pulmonary:      Effort: Pulmonary effort is normal. No respiratory distress.      Breath sounds: Normal breath sounds. No stridor. No wheezing or rales.   Chest:      Chest wall: Tenderness present.          Comments: Left upper chest wall tenderness extending to left lateral neck and scapula  Abdominal:      General: Bowel sounds are normal. There is no distension.      Palpations: Abdomen is soft. There is no mass.      Tenderness: There is no abdominal tenderness. There is no guarding or rebound.   Musculoskeletal: Normal range of motion.         General: No tenderness.   Lymphadenopathy:      Cervical: No cervical adenopathy.   Skin:     General: Skin is warm and dry.      Coloration: Skin is not pale.      Findings: No erythema or rash.   Neurological:      Mental Status: She is alert and oriented to person, place, and time.   Psychiatric:         Behavior: Behavior normal.         ED Course        Procedures                   No  results found for this or any previous visit (from the past 24 hour(s)).    Medications   morphine (PF) injection 4 mg (4 mg Intravenous Given 6/4/21 0440)   ondansetron (ZOFRAN) injection 4 mg (4 mg Intravenous Given 6/4/21 0440)   diazepam (VALIUM) tablet 5 mg (5 mg Oral Given 6/4/21 0623)   ketorolac (TORADOL) injection 15 mg (15 mg Intravenous Given 6/4/21 0623)   iopamidol (ISOVUE-370) solution 50 mL (50 mLs Intravenous Given 6/4/21 0807)   sodium chloride (PF) 0.9% PF flush 100 mL (100 mLs Intravenous Given 6/4/21 0807)   iopamidol (ISOVUE-370) solution 100 mL (100 mLs Intravenous Given 6/4/21 0807)       Assessments & Plan (with Medical Decision Making)   After rolling over in bed last night, felt left neck pain with radiation to left upper chest and left scapula  EKG NSR  Labs reviewed  Her pain continued despite multiple pain killer, CTA chest, neck ordered  S/o to Dr bardales at the change of shift.   I have reviewed the nursing notes.    I have reviewed the findings, diagnosis, plan and need for follow up with the patient.      Discharge Medication List as of 6/4/2021 10:09 AM      START taking these medications    Details   Lidocaine (LIDOCARE) 4 % Patch Place 1 patch onto the skin every 24 hours To prevent lidocaine toxicity, patient should be patch free for 12 hrs daily.Disp-10 patch, R-0Local Print      cyclobenzaprine (FLEXERIL) 10 MG tablet Take 1 tablet (10 mg) by mouth 3 times daily as needed for muscle spasms, Disp-20 tablet, R-0, Local Print             Final diagnoses:   Neck pain   Chest pain, unspecified type       6/4/2021   [facility]     Alexander Odom MD  06/04/21 0703       Alexander Odom MD  06/23/21 7642

## 2021-06-11 ENCOUNTER — APPOINTMENT (OUTPATIENT)
Dept: CT IMAGING | Facility: HOSPITAL | Age: 81
End: 2021-06-11
Attending: EMERGENCY MEDICINE
Payer: MEDICARE

## 2021-06-11 ENCOUNTER — APPOINTMENT (OUTPATIENT)
Dept: GENERAL RADIOLOGY | Facility: HOSPITAL | Age: 81
End: 2021-06-11
Attending: EMERGENCY MEDICINE
Payer: MEDICARE

## 2021-06-11 ENCOUNTER — HOSPITAL ENCOUNTER (EMERGENCY)
Facility: HOSPITAL | Age: 81
Discharge: ANOTHER HOSPITAL | End: 2021-06-12
Attending: EMERGENCY MEDICINE | Admitting: EMERGENCY MEDICINE
Payer: MEDICARE

## 2021-06-11 DIAGNOSIS — I31.39 PERICARDIAL EFFUSION: ICD-10-CM

## 2021-06-11 DIAGNOSIS — I26.99 ACUTE PULMONARY EMBOLISM WITHOUT ACUTE COR PULMONALE, UNSPECIFIED PULMONARY EMBOLISM TYPE (H): ICD-10-CM

## 2021-06-11 LAB
ALBUMIN SERPL-MCNC: 2.8 G/DL (ref 3.4–5)
ALBUMIN UR-MCNC: 30 MG/DL
ALP SERPL-CCNC: 80 U/L (ref 40–150)
ALT SERPL W P-5'-P-CCNC: 22 U/L (ref 0–50)
ANION GAP SERPL CALCULATED.3IONS-SCNC: 8 MMOL/L (ref 3–14)
APPEARANCE UR: ABNORMAL
AST SERPL W P-5'-P-CCNC: 20 U/L (ref 0–45)
BACTERIA #/AREA URNS HPF: ABNORMAL /HPF
BASE EXCESS BLDV CALC-SCNC: 1.9 MMOL/L
BASOPHILS # BLD AUTO: 0 10E9/L (ref 0–0.2)
BASOPHILS NFR BLD AUTO: 0.2 %
BILIRUB SERPL-MCNC: 0.4 MG/DL (ref 0.2–1.3)
BILIRUB UR QL STRIP: NEGATIVE
BUN SERPL-MCNC: 11 MG/DL (ref 7–30)
CALCIUM SERPL-MCNC: 8.5 MG/DL (ref 8.5–10.1)
CHLORIDE SERPL-SCNC: 103 MMOL/L (ref 94–109)
CO2 SERPL-SCNC: 25 MMOL/L (ref 20–32)
COLOR UR AUTO: YELLOW
CREAT SERPL-MCNC: 1.05 MG/DL (ref 0.52–1.04)
DIFFERENTIAL METHOD BLD: ABNORMAL
EOSINOPHIL # BLD AUTO: 0.1 10E9/L (ref 0–0.7)
EOSINOPHIL NFR BLD AUTO: 0.5 %
ERYTHROCYTE [DISTWIDTH] IN BLOOD BY AUTOMATED COUNT: 12.4 % (ref 10–15)
ERYTHROCYTE [DISTWIDTH] IN BLOOD BY AUTOMATED COUNT: 12.5 % (ref 10–15)
GFR SERPL CREATININE-BSD FRML MDRD: 50 ML/MIN/{1.73_M2}
GLUCOSE SERPL-MCNC: 114 MG/DL (ref 70–99)
GLUCOSE UR STRIP-MCNC: NEGATIVE MG/DL
HCO3 BLDV-SCNC: 26 MMOL/L (ref 21–28)
HCT VFR BLD AUTO: 35.7 % (ref 35–47)
HCT VFR BLD AUTO: 36.6 % (ref 35–47)
HGB BLD-MCNC: 11.9 G/DL (ref 11.7–15.7)
HGB BLD-MCNC: 12.2 G/DL (ref 11.7–15.7)
HGB UR QL STRIP: ABNORMAL
HYALINE CASTS #/AREA URNS LPF: 10 /LPF
IMM GRANULOCYTES # BLD: 0 10E9/L (ref 0–0.4)
IMM GRANULOCYTES NFR BLD: 0.4 %
INR PPP: 1.11 (ref 0.86–1.14)
KETONES UR STRIP-MCNC: 40 MG/DL
LABORATORY COMMENT REPORT: NORMAL
LEUKOCYTE ESTERASE UR QL STRIP: ABNORMAL
LYMPHOCYTES # BLD AUTO: 1.2 10E9/L (ref 0.8–5.3)
LYMPHOCYTES NFR BLD AUTO: 10.7 %
MCH RBC QN AUTO: 32.3 PG (ref 26.5–33)
MCH RBC QN AUTO: 32.3 PG (ref 26.5–33)
MCHC RBC AUTO-ENTMCNC: 33.3 G/DL (ref 31.5–36.5)
MCHC RBC AUTO-ENTMCNC: 33.3 G/DL (ref 31.5–36.5)
MCV RBC AUTO: 97 FL (ref 78–100)
MCV RBC AUTO: 97 FL (ref 78–100)
MONOCYTES # BLD AUTO: 0.8 10E9/L (ref 0–1.3)
MONOCYTES NFR BLD AUTO: 7.5 %
MUCOUS THREADS #/AREA URNS LPF: PRESENT /LPF
NEUTROPHILS # BLD AUTO: 8.7 10E9/L (ref 1.6–8.3)
NEUTROPHILS NFR BLD AUTO: 80.7 %
NITRATE UR QL: NEGATIVE
NRBC # BLD AUTO: 0 10*3/UL
NRBC BLD AUTO-RTO: 0 /100
NT-PROBNP SERPL-MCNC: 318 PG/ML (ref 0–1800)
O2/TOTAL GAS SETTING VFR VENT: ABNORMAL %
OXYHGB MFR BLDV: 67 %
PCO2 BLDV: 38 MM HG (ref 40–50)
PH BLDV: 7.45 PH (ref 7.32–7.43)
PH UR STRIP: 6 PH (ref 4.7–8)
PLATELET # BLD AUTO: 323 10E9/L (ref 150–450)
PLATELET # BLD AUTO: 355 10E9/L (ref 150–450)
PO2 BLDV: 35 MM HG (ref 25–47)
POTASSIUM SERPL-SCNC: 3.9 MMOL/L (ref 3.4–5.3)
PROT SERPL-MCNC: 8 G/DL (ref 6.8–8.8)
RBC # BLD AUTO: 3.68 10E12/L (ref 3.8–5.2)
RBC # BLD AUTO: 3.78 10E12/L (ref 3.8–5.2)
RBC #/AREA URNS AUTO: 3 /HPF (ref 0–2)
SARS-COV-2 RNA RESP QL NAA+PROBE: NEGATIVE
SODIUM SERPL-SCNC: 136 MMOL/L (ref 133–144)
SOURCE: ABNORMAL
SP GR UR STRIP: 1.03 (ref 1–1.03)
SPECIMEN SOURCE: NORMAL
SQUAMOUS #/AREA URNS AUTO: 14 /HPF (ref 0–1)
TROPONIN I SERPL-MCNC: 0.07 UG/L (ref 0–0.04)
TROPONIN I SERPL-MCNC: 0.07 UG/L (ref 0–0.04)
UROBILINOGEN UR STRIP-MCNC: 2 MG/DL (ref 0–2)
WBC # BLD AUTO: 10.8 10E9/L (ref 4–11)
WBC # BLD AUTO: 11.1 10E9/L (ref 4–11)
WBC #/AREA URNS AUTO: 11 /HPF (ref 0–5)

## 2021-06-11 PROCEDURE — 80053 COMPREHEN METABOLIC PANEL: CPT | Performed by: EMERGENCY MEDICINE

## 2021-06-11 PROCEDURE — C9803 HOPD COVID-19 SPEC COLLECT: HCPCS

## 2021-06-11 PROCEDURE — 85610 PROTHROMBIN TIME: CPT | Performed by: EMERGENCY MEDICINE

## 2021-06-11 PROCEDURE — 71045 X-RAY EXAM CHEST 1 VIEW: CPT

## 2021-06-11 PROCEDURE — 85027 COMPLETE CBC AUTOMATED: CPT | Mod: 91 | Performed by: EMERGENCY MEDICINE

## 2021-06-11 PROCEDURE — 250N000011 HC RX IP 250 OP 636: Performed by: EMERGENCY MEDICINE

## 2021-06-11 PROCEDURE — 99285 EMERGENCY DEPT VISIT HI MDM: CPT | Performed by: EMERGENCY MEDICINE

## 2021-06-11 PROCEDURE — 96366 THER/PROPH/DIAG IV INF ADDON: CPT

## 2021-06-11 PROCEDURE — 71275 CT ANGIOGRAPHY CHEST: CPT | Mod: ME

## 2021-06-11 PROCEDURE — U0005 INFEC AGEN DETEC AMPLI PROBE: HCPCS | Performed by: EMERGENCY MEDICINE

## 2021-06-11 PROCEDURE — 99285 EMERGENCY DEPT VISIT HI MDM: CPT | Mod: 25

## 2021-06-11 PROCEDURE — 96375 TX/PRO/DX INJ NEW DRUG ADDON: CPT | Mod: 59

## 2021-06-11 PROCEDURE — U0003 INFECTIOUS AGENT DETECTION BY NUCLEIC ACID (DNA OR RNA); SEVERE ACUTE RESPIRATORY SYNDROME CORONAVIRUS 2 (SARS-COV-2) (CORONAVIRUS DISEASE [COVID-19]), AMPLIFIED PROBE TECHNIQUE, MAKING USE OF HIGH THROUGHPUT TECHNOLOGIES AS DESCRIBED BY CMS-2020-01-R: HCPCS | Performed by: EMERGENCY MEDICINE

## 2021-06-11 PROCEDURE — 83880 ASSAY OF NATRIURETIC PEPTIDE: CPT | Performed by: EMERGENCY MEDICINE

## 2021-06-11 PROCEDURE — 81001 URINALYSIS AUTO W/SCOPE: CPT | Performed by: EMERGENCY MEDICINE

## 2021-06-11 PROCEDURE — 85025 COMPLETE CBC W/AUTO DIFF WBC: CPT | Performed by: EMERGENCY MEDICINE

## 2021-06-11 PROCEDURE — 36415 COLL VENOUS BLD VENIPUNCTURE: CPT | Performed by: EMERGENCY MEDICINE

## 2021-06-11 PROCEDURE — 85520 HEPARIN ASSAY: CPT | Performed by: EMERGENCY MEDICINE

## 2021-06-11 PROCEDURE — 82805 BLOOD GASES W/O2 SATURATION: CPT | Performed by: EMERGENCY MEDICINE

## 2021-06-11 PROCEDURE — 93005 ELECTROCARDIOGRAM TRACING: CPT

## 2021-06-11 PROCEDURE — 96365 THER/PROPH/DIAG IV INF INIT: CPT | Mod: 59

## 2021-06-11 PROCEDURE — 84484 ASSAY OF TROPONIN QUANT: CPT | Performed by: EMERGENCY MEDICINE

## 2021-06-11 PROCEDURE — 93010 ELECTROCARDIOGRAM REPORT: CPT | Performed by: INTERNAL MEDICINE

## 2021-06-11 RX ORDER — HEPARIN SODIUM 10000 [USP'U]/100ML
0-5000 INJECTION, SOLUTION INTRAVENOUS CONTINUOUS
Status: DISCONTINUED | OUTPATIENT
Start: 2021-06-11 | End: 2021-06-12 | Stop reason: HOSPADM

## 2021-06-11 RX ORDER — IOPAMIDOL 755 MG/ML
100 INJECTION, SOLUTION INTRAVASCULAR ONCE
Status: COMPLETED | OUTPATIENT
Start: 2021-06-11 | End: 2021-06-11

## 2021-06-11 RX ORDER — FENTANYL CITRATE 50 UG/ML
50 INJECTION, SOLUTION INTRAMUSCULAR; INTRAVENOUS ONCE
Status: COMPLETED | OUTPATIENT
Start: 2021-06-11 | End: 2021-06-11

## 2021-06-11 RX ADMIN — FENTANYL CITRATE 50 MCG: 50 INJECTION, SOLUTION INTRAMUSCULAR; INTRAVENOUS at 18:36

## 2021-06-11 RX ADMIN — IOPAMIDOL 100 ML: 755 INJECTION, SOLUTION INTRAVENOUS at 18:14

## 2021-06-11 RX ADMIN — HEPARIN SODIUM 1458 UNITS/HR: 10000 INJECTION, SOLUTION INTRAVENOUS at 19:35

## 2021-06-11 ASSESSMENT — ENCOUNTER SYMPTOMS
GASTROINTESTINAL NEGATIVE: 1
CARDIOVASCULAR NEGATIVE: 1
MUSCULOSKELETAL NEGATIVE: 1
ACTIVITY CHANGE: 1
NEUROLOGICAL NEGATIVE: 1
FEVER: 0
EYES NEGATIVE: 1
SHORTNESS OF BREATH: 1
DIAPHORESIS: 0
COUGH: 1

## 2021-06-11 NOTE — ED PROVIDER NOTES
"  History     Chief Complaint   Patient presents with     Shortness of Breath     c/o shortness of breath notes symptoms since last night and worse today. rr 36 in triage. sats 93%. c/o lt shoulder pain for the past week     HPI  Tenisha Nelson is a 80 year old female who brought to the emergency department by her family complaining of shortness of breath.  The patient was seen in the emergency department last week.  It sounds as though she had a work-up and was able to be discharged.  She has continued to have shortness of breath.  She was seen by her primary care provider on Tuesday.  Again she was discharge.  She has had increasingly worsening shortness of breath.  She states she is short of breath with exertion.  She does have a mild cough.  She states it is occasionally productive.  She wonders if she may not have \"pneumonia\".  She denies headache or dizziness.  She states she is not having any pain or pressure in her chest currently.  She has had no nausea or vomiting.  She is not had diarrhea or constipation.  She has had no hematuria or dysuria.    Allergies:  No Known Allergies    Problem List:    Patient Active Problem List    Diagnosis Date Noted     Osteopenia 06/25/2019     Priority: Medium     Primary osteoarthritis of left hip 05/16/2019     Priority: Medium     Obesity (BMI 30.0-34.9) 05/08/2017     Priority: Medium     Impaired fasting glucose 09/25/2012     Priority: Medium     Acquired spondylolisthesis 03/28/2012     Priority: Medium     IMO Update 10/11       CKD (chronic kidney disease) stage 3, GFR 30-59 ml/min 03/28/2011     Priority: Medium     IMO Update 10/11       Urinary incontinence 06/29/2009     Priority: Medium     IMO Update 10/11       Benign neoplasm of colon 05/27/2008     Priority: Medium     Family history of malignant neoplasm of gastrointestinal tract 02/16/2007     Priority: Medium     7/8/19: hyperplastic polyp x 2 on colonoscopy. Recommend a 5 year follow up colonoscopy due " to her family history of colon cancer, however at her age she may discuss with her primary care provider about utility of a colonoscopy at 84.       Hyperlipidemia 2007     Priority: Medium     IMO Update 10/11       Essential hypertension 2003     Priority: Medium        Past Medical History:    Past Medical History:   Diagnosis Date     PONV (postoperative nausea and vomiting)        Past Surgical History:    Past Surgical History:   Procedure Laterality Date     COLONOSCOPY      Lambert; polyps     COLONOSCOPY N/A 2019    Procedure: COLONOSCOPY with Polypectomy;  Surgeon: Wero Woodard MD;  Location: [facility]     REPAIR HAMMER TOE Left 2016    Procedure: REPAIR HAMMER TOE;  Surgeon: Celso De La Cruz DPM;  Location: [facility]     REPAIR HAMMER TOE Right 2016    Procedure: REPAIR HAMMER TOE;  Surgeon: Celso De La Cruz DPM;  Location: [facility]       Family History:    Family History   Problem Relation Age of Onset     Colon Cancer Sister         diagnosed at age 62     Leukemia Brother      Liver Cancer Brother      Bone Cancer Brother      Heart Disease Brother        Social History:  Marital Status:   [2]  Social History     Tobacco Use     Smoking status: Former Smoker     Types: Cigarettes     Quit date: 1989     Years since quittin.4     Smokeless tobacco: Former User   Substance Use Topics     Alcohol use: Not Currently     Drug use: Not Currently        Medications:    AMLODIPINE BESYLATE PO  ASPIRIN ADULT LOW STRENGTH PO  ATORVASTATIN CALCIUM PO  METOPROLOL TARTRATE PO  oxybutynin (DITROPAN-XL) 10 MG 24 hr tablet  Lidocaine (LIDOCARE) 4 % Patch          Review of Systems   Constitutional: Positive for activity change. Negative for diaphoresis and fever.   HENT: Negative.    Eyes: Negative.    Respiratory: Positive for cough and shortness of breath.    Cardiovascular: Negative.    Gastrointestinal: Negative.    Genitourinary: Negative.    Musculoskeletal: Negative.     Neurological: Negative.    Parminder see history of chief complaint.  All other systems reviewed and they are found unremarkable.    Physical Exam   BP: 119/64  Pulse: 113  Temp: 97.9  F (36.6  C)  Resp: (!) 36  Weight: 81 kg (178 lb 9.2 oz)  SpO2: 93 %      Physical Exam is an 80-year-old female who is awake alert oriented person place and time.  She is very pleasant and cooperative.  She speaks in complete sentences.  She is mildly dyspneic at rest.  HEENT normocephalic extraocular muscles intact pupils equally round and reactive to light.  Tongue midline palate intact oropharynx is unremarkable.  Neck is supple there is no palpable tenderness.  Full range of motion is maintained.  There is no JVD.  Pulmonary exam patient has bibasilar inspiratory crackles.  She does have diminished breath sounds.  No wheezes no rhonchi.  Heart maintains a regular rate and rhythm.  S1 and S2 sounds are appreciated.  No murmur.  The abdomen is soft is nontender no mass no organomegaly no rebound.  Extremities have full range of motion and 5/5 strength.  No edema is noted.  Neurologic exam no focal deficit.  Dermatologic exam no diffuse skin rashes or lesions.    ED Course        Patient remained very stable throughout her stay in the department.  I discussed findings with both her and her daughter.  I discussed the case with Dr. Palmer who is the on-call hospitalist at [facility].  She very graciously agreed to accept the patient in transfer.  We will transfer the patient by advanced life support to be made a direct admission to [facility].  We will also initiate heparin infusion prior to transfer.          {EKG done and interpreted by myself.  It shows a sinus tachycardia.  The ventricular rate is 108 bpm.  The VT interval is 154 ms.  The corrected QT is 415 ms.  Patient does have Q waves in lead III.  The computer interprets possible anterior infarct.  I do not see any evidence of that.  There is no ST segment  elevation or depression there is no inappropriate T wave inversion.  There appears to be no evidence of ischemic change by my interpretation.               Results for orders placed or performed during the hospital encounter of 06/11/21 (from the past 24 hour(s))   CBC with platelets differential   Result Value Ref [place]    WBC 10.8 4.0 - 11.0 10e9/L    RBC Count 3.78 (L) 3.8 - 5.2 10e12/L    Hemoglobin 12.2 11.7 - 15.7 g/dL    Hematocrit 36.6 35.0 - 47.0 %    MCV 97 78 - 100 fl    MCH 32.3 26.5 - 33.0 pg    MCHC 33.3 31.5 - 36.5 g/dL    RDW 12.5 10.0 - 15.0 %    Platelet Count 355 150 - 450 10e9/L    Diff Method Automated Method     % Neutrophils 80.7 %    % Lymphocytes 10.7 %    % Monocytes 7.5 %    % Eosinophils 0.5 %    % Basophils 0.2 %    % Immature Granulocytes 0.4 %    Nucleated RBCs 0 0 /100    Absolute Neutrophil 8.7 (H) 1.6 - 8.3 10e9/L    Absolute Lymphocytes 1.2 0.8 - 5.3 10e9/L    Absolute Monocytes 0.8 0.0 - 1.3 10e9/L    Absolute Eosinophils 0.1 0.0 - 0.7 10e9/L    Absolute Basophils 0.0 0.0 - 0.2 10e9/L    Abs Immature Granulocytes 0.0 0 - 0.4 10e9/L    Absolute Nucleated RBC 0.0    INR   Result Value Ref [place]    INR 1.11 0.86 - 1.14   Comprehensive metabolic panel   Result Value Ref [place]    Sodium 136 133 - 144 mmol/L    Potassium 3.9 3.4 - 5.3 mmol/L    Chloride 103 94 - 109 mmol/L    Carbon Dioxide 25 20 - 32 mmol/L    Anion Gap 8 3 - 14 mmol/L    Glucose 114 (H) 70 - 99 mg/dL    Urea Nitrogen 11 7 - 30 mg/dL    Creatinine 1.05 (H) 0.52 - 1.04 mg/dL    GFR Estimate 50 (L) >60 mL/min/[1.73_m2]    GFR Estimate If Black 58 (L) >60 mL/min/[1.73_m2]    Calcium 8.5 8.5 - 10.1 mg/dL    Bilirubin Total 0.4 0.2 - 1.3 mg/dL    Albumin 2.8 (L) 3.4 - 5.0 g/dL    Protein Total 8.0 6.8 - 8.8 g/dL    Alkaline Phosphatase 80 40 - 150 U/L    ALT 22 0 - 50 U/L    AST 20 0 - 45 U/L   Troponin I   Result Value Ref [place]    Troponin I ES 0.066 (H) 0.000 - 0.045 ug/L   Nt probnp inpatient (BNP)   Result Value Ref  [facility]    N-Terminal Pro BNP Inpatient 318 0 - 1,800 pg/mL   Blood gas venous and oxyhgb   Result Value Ref [place]    Ph Venous 7.45 (H) 7.32 - 7.43 pH    PCO2 Venous 38 (L) 40 - 50 mm Hg    PO2 Venous 35 25 - 47 mm Hg    Bicarbonate Venous 26 21 - 28 mmol/L    FIO2 RA     Oxyhemoglobin Venous 67 %    Base Excess Venous 1.9 mmol/L   XR Chest Port 1 View    Narrative    PROCEDURE:  XR CHEST PORT 1 VIEW    HISTORY: dyspnea. .    COMPARISON:  6/4/2021    FINDINGS:    The cardiomediastinal contours are enlarged, unchanged.  Linear opacities at the bases may reflect scarring or atelectasis. No  effusion or pneumothorax is seen.      Impression    IMPRESSION: Cardiomegaly.    Bibasilar linear opacities are favored to reflect scarring or  atelectasis. Consider follow-up.    KAM PARIS MD   UA with Microscopic   Result Value Ref [place]    Color Urine Yellow     Appearance Urine Slightly Cloudy     Glucose Urine Negative NEG^Negative mg/dL    Bilirubin Urine Negative NEG^Negative    Ketones Urine 40 (A) NEG^Negative mg/dL    Specific Gravity Urine 1.026 1.003 - 1.035    Blood Urine Small (A) NEG^Negative    pH Urine 6.0 4.7 - 8.0 pH    Protein Albumin Urine 30 (A) NEG^Negative mg/dL    Urobilinogen mg/dL 2.0 0.0 - 2.0 mg/dL    Nitrite Urine Negative NEG^Negative    Leukocyte Esterase Urine Small (A) NEG^Negative    Source Midstream Urine     WBC Urine 11 (H) 0 - 5 /HPF    RBC Urine 3 (H) 0 - 2 /HPF    Bacteria Urine None (A) NEG^Negative /HPF    Squamous Epithelial /HPF Urine 14 (H) 0 - 1 /HPF    Mucous Urine Present (A) NEG^Negative /LPF    Hyaline Casts 10 (A) OTO2^O - 2 /LPF   Troponin I   Result Value Ref [place]    Troponin I ES 0.065 (H) 0.000 - 0.045 ug/L   CTA Chest with Contrast    Narrative    PROCEDURE:  CTA CHEST WITH CONTRAST.    HISTORY:  Evaluate for pulmonary embolism.  PE suspected, high prob    TECHNIQUE:  Initial pre-contrast  and localizer images were  obtained.  Contrast enhanced helical CT  pulmonary angiography was then  performed.  Routine transaxial and post-processed (multiplanar and/or  MIP) reformations were obtained.    COMPARISON:  6/4/2021    MEDS/CONTRAST: ISOVUE 370 100ML    PULMONARY CTA FINDINGS:  Acute pulmonary emboli are seen in the right  lower lobe.    OTHER FINDINGS:      The heart is enlarged. There is a moderate pericardial effusion, worse  when compared to prior.     Small left and trace right pleural effusions are present. Linear and  patchy opacity in both lungs is favored to reflect atelectasis.    Limited views of the upper abdomen reveal no adrenal mass or acute  process.     No suspicious osseous lesion is identified.      Impression    IMPRESSION:    Multiple acute pulmonary emboli in the right lower lobe, the overall  burden is small.    Worse, now moderate pericardial effusion.    Worse bands of probable atelectasis. Superimposed infection is not  excluded in the appropriate clinical context    KAM PARIS MD       Medications   heparin 25,000 units in 0.45% NaCl 250 mL ANTICOAGULANT infusion (has no administration in time range)   iopamidol (ISOVUE-370) solution 100 mL (100 mLs Intravenous Given 6/11/21 1814)   sodium chloride (PF) 0.9% PF flush 100 mL (50 mLs Intravenous Given 6/11/21 1814)   fentaNYL (PF) (SUBLIMAZE) injection 50 mcg (50 mcg Intravenous Given 6/11/21 1836)       Assessments & Plan (with Medical Decision Making)     I have reviewed the nursing notes.    I have reviewed the findings, diagnosis, plan and need for follow up with the patient.  Plan is to transfer the patient to [facility] for admission for further diagnostic and therapeutic work-up.    New Prescriptions    No medications on file       Final diagnoses:   Pericardial effusion   Acute pulmonary embolism without acute cor pulmonale, unspecified pulmonary embolism type (H)       6/11/2021   [facility]     Spenser Khanna,   06/26/21 0769

## 2021-06-11 NOTE — DISCHARGE INSTRUCTIONS
What to expect when you have contrast    During your exam, we will inject  contrast  into your vein or artery. (Contrast is a clear liquid with iodine in it. It shows up on X-rays.)    You may feel warm or hot. You may have a metal taste in your mouth and a slight upset stomach. You may also feel pressure near the kidneys and bladder. These effects will last about 1 to 3 minutes.    Please tell us if you have:    Sneezing     Itching    Hives     Swelling in the face    A hoarse voice    Breathing problems    Other new symptoms    Serious problems are rare.  They may include:    Irregular heartbeat     Seizures    Kidney failure              Tissue damage    Shock      Death    If you have any problems during the exam, we  will treat them right away.    When you get home    Call your hospital if you have any new symptoms in the next 2 days, like hives or swelling. (Phone numbers are at the bottom of this page.) Or call your family doctor.     If you have wheezing or trouble breathing, call 911.    Self-care  -Drink at least 4 extra glasses of water today.   This reduces the stress on your kidneys.  -Keep taking your regular medicines.    The contrast will pass out of your body in your  Urine(pee). This will happen in the next 24 hours. You  will not feel this. Your urine will not  change color.        I have read and understand the above information.    Patient Sign Here:______________________________________Date:________Time:______    Staff Sign Here:________________________________________Date:_______Time:______      Radiology Departments:     ?[facility]: [phone] ?[place]: [phone]     ?[place]: [phone] ?[facility]:[phone]      ?[place]: [phone]  ?[place]: [phone]  ?[place]:[phone]    ?[facility]:[phone]  ?[facility]:[phone]

## 2021-06-12 VITALS
TEMPERATURE: 96.5 F | BODY MASS INDEX: 33.74 KG/M2 | RESPIRATION RATE: 24 BRPM | WEIGHT: 178.57 LBS | DIASTOLIC BLOOD PRESSURE: 78 MMHG | OXYGEN SATURATION: 95 % | HEART RATE: 105 BPM | SYSTOLIC BLOOD PRESSURE: 124 MMHG

## 2021-06-12 LAB
UFH PPP CHRO-ACNC: 0.3 IU/ML
UFH PPP CHRO-ACNC: 0.58 IU/ML

## 2021-06-12 PROCEDURE — 36415 COLL VENOUS BLD VENIPUNCTURE: CPT | Performed by: EMERGENCY MEDICINE

## 2021-06-12 PROCEDURE — 85520 HEPARIN ASSAY: CPT | Performed by: EMERGENCY MEDICINE

## 2021-06-12 NOTE — ED NOTES
Face to face report given with opportunity to observe patient.    Report given to LETY Almaguer RN   6/11/2021  7:07 PM

## 2021-06-12 NOTE — ED NOTES
reports that the COVID swab was ran and resulted negative. Result will not flow into chart at this time

## 2021-06-23 ENCOUNTER — APPOINTMENT (OUTPATIENT)
Dept: CT IMAGING | Facility: HOSPITAL | Age: 81
End: 2021-06-23
Attending: STUDENT IN AN ORGANIZED HEALTH CARE EDUCATION/TRAINING PROGRAM
Payer: MEDICARE

## 2021-06-23 ENCOUNTER — HOSPITAL ENCOUNTER (EMERGENCY)
Facility: HOSPITAL | Age: 81
Discharge: HOME | End: 2021-06-23
Attending: STUDENT IN AN ORGANIZED HEALTH CARE EDUCATION/TRAINING PROGRAM | Admitting: STUDENT IN AN ORGANIZED HEALTH CARE EDUCATION/TRAINING PROGRAM
Payer: MEDICARE

## 2021-06-23 VITALS
HEART RATE: 110 BPM | DIASTOLIC BLOOD PRESSURE: 81 MMHG | RESPIRATION RATE: 20 BRPM | BODY MASS INDEX: 31.37 KG/M2 | SYSTOLIC BLOOD PRESSURE: 132 MMHG | WEIGHT: 166 LBS | OXYGEN SATURATION: 98 % | TEMPERATURE: 100.3 F

## 2021-06-23 DIAGNOSIS — R09.02 HYPOXIA: ICD-10-CM

## 2021-06-23 DIAGNOSIS — J90 PLEURAL EFFUSION: ICD-10-CM

## 2021-06-23 DIAGNOSIS — I31.39 PERICARDIAL EFFUSION: ICD-10-CM

## 2021-06-23 LAB
ALBUMIN SERPL-MCNC: 2.6 G/DL (ref 3.4–5)
ALP SERPL-CCNC: 142 U/L (ref 40–150)
ALT SERPL W P-5'-P-CCNC: 80 U/L (ref 0–50)
ANION GAP SERPL CALCULATED.3IONS-SCNC: 8 MMOL/L (ref 3–14)
AST SERPL W P-5'-P-CCNC: 36 U/L (ref 0–45)
BASE EXCESS BLDV CALC-SCNC: 2.1 MMOL/L
BASOPHILS # BLD AUTO: 0 10E9/L (ref 0–0.2)
BASOPHILS NFR BLD AUTO: 0.1 %
BILIRUB SERPL-MCNC: 0.5 MG/DL (ref 0.2–1.3)
BUN SERPL-MCNC: 11 MG/DL (ref 7–30)
CALCIUM SERPL-MCNC: 8.5 MG/DL (ref 8.5–10.1)
CHLORIDE SERPL-SCNC: 99 MMOL/L (ref 94–109)
CO2 SERPL-SCNC: 27 MMOL/L (ref 20–32)
CREAT SERPL-MCNC: 0.79 MG/DL (ref 0.52–1.04)
CRP SERPL-MCNC: 169 MG/L (ref 0–8)
DIFFERENTIAL METHOD BLD: ABNORMAL
EOSINOPHIL # BLD AUTO: 0 10E9/L (ref 0–0.7)
EOSINOPHIL NFR BLD AUTO: 0.2 %
ERYTHROCYTE [DISTWIDTH] IN BLOOD BY AUTOMATED COUNT: 13.3 % (ref 10–15)
GFR SERPL CREATININE-BSD FRML MDRD: 70 ML/MIN/{1.73_M2}
GLUCOSE SERPL-MCNC: 138 MG/DL (ref 70–99)
HCO3 BLDV-SCNC: 28 MMOL/L (ref 21–28)
HCT VFR BLD AUTO: 36.5 % (ref 35–47)
HGB BLD-MCNC: 12.2 G/DL (ref 11.7–15.7)
IMM GRANULOCYTES # BLD: 0.1 10E9/L (ref 0–0.4)
IMM GRANULOCYTES NFR BLD: 0.6 %
LABORATORY COMMENT REPORT: NORMAL
LACTATE BLD-SCNC: 1.9 MMOL/L (ref 0.7–2)
LYMPHOCYTES # BLD AUTO: 1.3 10E9/L (ref 0.8–5.3)
LYMPHOCYTES NFR BLD AUTO: 9.8 %
MCH RBC QN AUTO: 32.1 PG (ref 26.5–33)
MCHC RBC AUTO-ENTMCNC: 33.4 G/DL (ref 31.5–36.5)
MCV RBC AUTO: 96 FL (ref 78–100)
MONOCYTES # BLD AUTO: 1.1 10E9/L (ref 0–1.3)
MONOCYTES NFR BLD AUTO: 8.2 %
NEUTROPHILS # BLD AUTO: 10.9 10E9/L (ref 1.6–8.3)
NEUTROPHILS NFR BLD AUTO: 81.1 %
NRBC # BLD AUTO: 0 10*3/UL
NRBC BLD AUTO-RTO: 0 /100
NT-PROBNP SERPL-MCNC: 861 PG/ML (ref 0–1800)
O2/TOTAL GAS SETTING VFR VENT: NORMAL %
OXYHGB MFR BLDV: 37 %
PCO2 BLDV: 46 MM HG (ref 40–50)
PH BLDV: 7.39 PH (ref 7.32–7.43)
PLATELET # BLD AUTO: 479 10E9/L (ref 150–450)
PO2 BLDV: 25 MM HG (ref 25–47)
POTASSIUM SERPL-SCNC: 3.8 MMOL/L (ref 3.4–5.3)
PROT SERPL-MCNC: 8 G/DL (ref 6.8–8.8)
RBC # BLD AUTO: 3.8 10E12/L (ref 3.8–5.2)
SARS-COV-2 RNA RESP QL NAA+PROBE: NEGATIVE
SODIUM SERPL-SCNC: 134 MMOL/L (ref 133–144)
SPECIMEN SOURCE: NORMAL
TROPONIN I SERPL-MCNC: <0.015 UG/L (ref 0–0.04)
WBC # BLD AUTO: 13.4 10E9/L (ref 4–11)

## 2021-06-23 PROCEDURE — 96374 THER/PROPH/DIAG INJ IV PUSH: CPT | Mod: XU

## 2021-06-23 PROCEDURE — 83880 ASSAY OF NATRIURETIC PEPTIDE: CPT | Performed by: STUDENT IN AN ORGANIZED HEALTH CARE EDUCATION/TRAINING PROGRAM

## 2021-06-23 PROCEDURE — C9803 HOPD COVID-19 SPEC COLLECT: HCPCS

## 2021-06-23 PROCEDURE — 99285 EMERGENCY DEPT VISIT HI MDM: CPT | Performed by: STUDENT IN AN ORGANIZED HEALTH CARE EDUCATION/TRAINING PROGRAM

## 2021-06-23 PROCEDURE — 86140 C-REACTIVE PROTEIN: CPT | Performed by: STUDENT IN AN ORGANIZED HEALTH CARE EDUCATION/TRAINING PROGRAM

## 2021-06-23 PROCEDURE — 250N000011 HC RX IP 250 OP 636: Performed by: STUDENT IN AN ORGANIZED HEALTH CARE EDUCATION/TRAINING PROGRAM

## 2021-06-23 PROCEDURE — 83605 ASSAY OF LACTIC ACID: CPT | Performed by: STUDENT IN AN ORGANIZED HEALTH CARE EDUCATION/TRAINING PROGRAM

## 2021-06-23 PROCEDURE — 36415 COLL VENOUS BLD VENIPUNCTURE: CPT

## 2021-06-23 PROCEDURE — 93010 ELECTROCARDIOGRAM REPORT: CPT | Performed by: INTERNAL MEDICINE

## 2021-06-23 PROCEDURE — 96375 TX/PRO/DX INJ NEW DRUG ADDON: CPT | Mod: XU

## 2021-06-23 PROCEDURE — 80053 COMPREHEN METABOLIC PANEL: CPT | Performed by: STUDENT IN AN ORGANIZED HEALTH CARE EDUCATION/TRAINING PROGRAM

## 2021-06-23 PROCEDURE — U0003 INFECTIOUS AGENT DETECTION BY NUCLEIC ACID (DNA OR RNA); SEVERE ACUTE RESPIRATORY SYNDROME CORONAVIRUS 2 (SARS-COV-2) (CORONAVIRUS DISEASE [COVID-19]), AMPLIFIED PROBE TECHNIQUE, MAKING USE OF HIGH THROUGHPUT TECHNOLOGIES AS DESCRIBED BY CMS-2020-01-R: HCPCS | Performed by: STUDENT IN AN ORGANIZED HEALTH CARE EDUCATION/TRAINING PROGRAM

## 2021-06-23 PROCEDURE — 82805 BLOOD GASES W/O2 SATURATION: CPT | Performed by: STUDENT IN AN ORGANIZED HEALTH CARE EDUCATION/TRAINING PROGRAM

## 2021-06-23 PROCEDURE — 84484 ASSAY OF TROPONIN QUANT: CPT | Performed by: STUDENT IN AN ORGANIZED HEALTH CARE EDUCATION/TRAINING PROGRAM

## 2021-06-23 PROCEDURE — 71275 CT ANGIOGRAPHY CHEST: CPT | Mod: ME

## 2021-06-23 PROCEDURE — U0005 INFEC AGEN DETEC AMPLI PROBE: HCPCS | Performed by: STUDENT IN AN ORGANIZED HEALTH CARE EDUCATION/TRAINING PROGRAM

## 2021-06-23 PROCEDURE — 85025 COMPLETE CBC W/AUTO DIFF WBC: CPT | Performed by: STUDENT IN AN ORGANIZED HEALTH CARE EDUCATION/TRAINING PROGRAM

## 2021-06-23 PROCEDURE — 93005 ELECTROCARDIOGRAM TRACING: CPT

## 2021-06-23 PROCEDURE — 99285 EMERGENCY DEPT VISIT HI MDM: CPT | Mod: 25

## 2021-06-23 RX ORDER — HYDROMORPHONE HYDROCHLORIDE 1 MG/ML
0.5 INJECTION, SOLUTION INTRAMUSCULAR; INTRAVENOUS; SUBCUTANEOUS
Status: COMPLETED | OUTPATIENT
Start: 2021-06-23 | End: 2021-06-23

## 2021-06-23 RX ORDER — COLCHICINE 0.6 MG/1
0.6 TABLET ORAL
COMMUNITY
Start: 2021-06-17 | End: 2021-09-15

## 2021-06-23 RX ORDER — BENZONATATE 100 MG/1
100 CAPSULE ORAL
COMMUNITY
Start: 2021-06-17 | End: 2023-11-16

## 2021-06-23 RX ORDER — ONDANSETRON 2 MG/ML
4 INJECTION INTRAMUSCULAR; INTRAVENOUS EVERY 30 MIN PRN
Status: DISCONTINUED | OUTPATIENT
Start: 2021-06-23 | End: 2021-06-24 | Stop reason: HOSPADM

## 2021-06-23 RX ORDER — IOPAMIDOL 755 MG/ML
75 INJECTION, SOLUTION INTRAVASCULAR ONCE
Status: COMPLETED | OUTPATIENT
Start: 2021-06-23 | End: 2021-06-23

## 2021-06-23 RX ORDER — ONDANSETRON 4 MG/1
4 TABLET, ORALLY DISINTEGRATING ORAL
COMMUNITY
Start: 2021-06-17 | End: 2023-11-16

## 2021-06-23 RX ORDER — ACETAMINOPHEN 325 MG/1
650 TABLET ORAL
Status: ON HOLD | COMMUNITY
Start: 2021-06-17 | End: 2023-12-05

## 2021-06-23 RX ADMIN — ONDANSETRON 4 MG: 2 INJECTION INTRAMUSCULAR; INTRAVENOUS at 16:58

## 2021-06-23 RX ADMIN — IOPAMIDOL 100 ML: 755 INJECTION, SOLUTION INTRAVENOUS at 17:50

## 2021-06-23 RX ADMIN — HYDROMORPHONE HYDROCHLORIDE 0.5 MG: 1 INJECTION, SOLUTION INTRAMUSCULAR; INTRAVENOUS; SUBCUTANEOUS at 17:01

## 2021-06-23 ASSESSMENT — ENCOUNTER SYMPTOMS
VOMITING: 0
DYSURIA: 0
RHINORRHEA: 0
CONSTIPATION: 0
FLANK PAIN: 0
WOUND: 0
NECK PAIN: 0
CHILLS: 0
SORE THROAT: 0
DIARRHEA: 0
BACK PAIN: 0
WHEEZING: 0
EYE DISCHARGE: 0
EYE REDNESS: 0
DIZZINESS: 0
EYE PAIN: 0
SHORTNESS OF BREATH: 1
ABDOMINAL PAIN: 0
FEVER: 0
FACIAL SWELLING: 0
HEMATURIA: 0
WEAKNESS: 0
HEADACHES: 0
NUMBNESS: 0
NAUSEA: 0

## 2021-06-23 NOTE — DISCHARGE INSTRUCTIONS

## 2021-06-23 NOTE — ED TRIAGE NOTES
Pt in for evaluation of chest pain and sob. Reports recent hx of admission to [facility] for fluid build up around her heart. Was discharged Thursday from [facility]. Pt reports over the last 2 days she had felt more fatigued, sob and has had increased pain. Pt was sent home with Incentive Spirometer and reports her numbers have been decreased only able to get it to 1000 today. Pt reports recent dx of PE as well and was started on Elliquis.

## 2021-06-23 NOTE — ED PROVIDER NOTES
History     Chief Complaint   Patient presents with     Shortness of Breath     Chest Pain     HPI  Tenisha Nelson is a 81 year old female who presents to the emergency department over concern of chest pain and worsening shortness of breath.  Patient has a hard time moving around her home without becoming increasingly short of breath.  Denies any fever, chills, cough, runny nose, sore throat.  Denies any syncope falls or injuries.  Denies any headache, vision changes, weakness, numbness, tingling.  States has been taking her medications as instructed.  She was recently discharged from [facility], was admitted there and found to have a pericardial effusion, unclear etiology, as well as pulmonary emboli.  Patient was started on Eliquis.    Allergies:  No Known Allergies    Problem List:    Patient Active Problem List    Diagnosis Date Noted     Osteopenia 06/25/2019     Priority: Medium     Primary osteoarthritis of left hip 05/16/2019     Priority: Medium     Obesity (BMI 30.0-34.9) 05/08/2017     Priority: Medium     Impaired fasting glucose 09/25/2012     Priority: Medium     Acquired spondylolisthesis 03/28/2012     Priority: Medium     IMO Update 10/11       CKD (chronic kidney disease) stage 3, GFR 30-59 ml/min 03/28/2011     Priority: Medium     IMO Update 10/11       Urinary incontinence 06/29/2009     Priority: Medium     IMO Update 10/11       Benign neoplasm of colon 05/27/2008     Priority: Medium     Family history of malignant neoplasm of gastrointestinal tract 02/16/2007     Priority: Medium     7/8/19: hyperplastic polyp x 2 on colonoscopy. Recommend a 5 year follow up colonoscopy due to her family history of colon cancer, however at her age she may discuss with her primary care provider about utility of a colonoscopy at 84.       Hyperlipidemia 02/16/2007     Priority: Medium     IMO Update 10/11       Essential hypertension 06/27/2003     Priority: Medium        Past Medical History:    Past Medical  History:   Diagnosis Date     PONV (postoperative nausea and vomiting)        Past Surgical History:    Past Surgical History:   Procedure Laterality Date     COLONOSCOPY      Lambert; polyps     COLONOSCOPY N/A 2019    Procedure: COLONOSCOPY with Polypectomy;  Surgeon: Wero Woodard MD;  Location: [facility]     REPAIR HAMMER TOE Left 2016    Procedure: REPAIR HAMMER TOE;  Surgeon: Celso De La Cruz DPM;  Location: [facility]     REPAIR HAMMER TOE Right 2016    Procedure: REPAIR HAMMER TOE;  Surgeon: Celso De La Cruz DPM;  Location: [facility]       Family History:    Family History   Problem Relation Age of Onset     Colon Cancer Sister         diagnosed at age 62     Leukemia Brother      Liver Cancer Brother      Bone Cancer Brother      Heart Disease Brother        Social History:  Marital Status:   [2]  Social History     Tobacco Use     Smoking status: Former Smoker     Types: Cigarettes     Quit date: 1989     Years since quittin.4     Smokeless tobacco: Former User   Substance Use Topics     Alcohol use: Not Currently     Drug use: Not Currently        Medications:    acetaminophen (TYLENOL) 325 MG tablet  apixaban ANTICOAGULANT (ELIQUIS) 5 MG tablet  benzonatate (TESSALON) 100 MG capsule  colchicine (COLCYRS) 0.6 MG tablet  Lidocaine (LIDOCARE) 4 % Patch  METOPROLOL TARTRATE PO  ondansetron (ZOFRAN-ODT) 4 MG ODT tab  oxybutynin (DITROPAN-XL) 10 MG 24 hr tablet          Review of Systems   Constitutional: Negative for chills and fever.   HENT: Negative for ear pain, facial swelling, rhinorrhea and sore throat.    Eyes: Negative for pain, discharge and redness.   Respiratory: Positive for shortness of breath. Negative for wheezing.    Cardiovascular: Positive for chest pain.   Gastrointestinal: Negative for abdominal pain, constipation, diarrhea, nausea and vomiting.   Genitourinary: Negative for dysuria, flank pain, hematuria, vaginal bleeding and vaginal discharge.   Musculoskeletal:  Negative for back pain and neck pain.   Skin: Negative for rash and wound.   Neurological: Negative for dizziness, weakness, numbness and headaches.       Physical Exam   BP: 143/84  Pulse: 119  Temp: 101.1  F (38.4  C)  Resp: 20  Weight: 75.3 kg (166 lb)  SpO2: 92 %      Physical Exam  Constitutional:       General: She is not in acute distress.     Appearance: Normal appearance. She is not diaphoretic.   HENT:      Head: Normocephalic and atraumatic.      Nose: Nose normal.      Mouth/Throat:      Mouth: Mucous membranes are moist.      Pharynx: Oropharynx is clear. No oropharyngeal exudate.   Eyes:      General: Lids are normal. No scleral icterus.     Extraocular Movements: Extraocular movements intact.      Conjunctiva/sclera: Conjunctivae normal.      Pupils: Pupils are equal, round, and reactive to light.   Neck:      Musculoskeletal: Full passive range of motion without pain, normal range of motion and neck supple.   Cardiovascular:      Rate and Rhythm: Regular rhythm. Tachycardia present.      Pulses: Normal pulses.      Heart sounds: Normal heart sounds. No murmur. No friction rub. No gallop.    Pulmonary:      Effort: Pulmonary effort is normal. No respiratory distress.      Breath sounds: No stridor. Examination of the right-lower field reveals decreased breath sounds. Examination of the left-lower field reveals decreased breath sounds. Decreased breath sounds present. No wheezing, rhonchi or rales.   Abdominal:      General: Bowel sounds are normal.      Palpations: Abdomen is soft.      Tenderness: There is no abdominal tenderness.   Musculoskeletal: Normal range of motion.         General: No tenderness.   Skin:     General: Skin is warm and dry.      Capillary Refill: Capillary refill takes less than 2 seconds.      Findings: No rash.   Neurological:      General: No focal deficit present.      Mental Status: She is oriented to person, place, and time. Mental status is at baseline.      GCS: GCS eye  subscore is 4. GCS verbal subscore is 5. GCS motor subscore is 6.   Psychiatric:         Attention and Perception: Attention normal.         Mood and Affect: Mood normal.         Speech: Speech normal.         Behavior: Behavior normal.         ED Course        Procedures             EKG Interpretation:      Interpreted by Jair Gonzalez MD  Time reviewed: 16:30  Symptoms at time of EKG: CP, SOB   Rhythm: sinus tachycardia  Rate: 120-130  Axis: Normal  Ectopy: none  Conduction: normal  ST Segments/ T Waves: Non-specific ST-T wave changes  Q Waves: none  Comparison to prior: No old EKG available    Clinical Impression: sinus tachycardia       Results for orders placed or performed during the hospital encounter of 06/23/21 (from the past 24 hour(s))   CBC with platelets differential   Result Value Ref Range    WBC 13.4 (H) 4.0 - 11.0 10e9/L    RBC Count 3.80 3.8 - 5.2 10e12/L    Hemoglobin 12.2 11.7 - 15.7 g/dL    Hematocrit 36.5 35.0 - 47.0 %    MCV 96 78 - 100 fl    MCH 32.1 26.5 - 33.0 pg    MCHC 33.4 31.5 - 36.5 g/dL    RDW 13.3 10.0 - 15.0 %    Platelet Count 479 (H) 150 - 450 10e9/L    Diff Method Automated Method     % Neutrophils 81.1 %    % Lymphocytes 9.8 %    % Monocytes 8.2 %    % Eosinophils 0.2 %    % Basophils 0.1 %    % Immature Granulocytes 0.6 %    Nucleated RBCs 0 0 /100    Absolute Neutrophil 10.9 (H) 1.6 - 8.3 10e9/L    Absolute Lymphocytes 1.3 0.8 - 5.3 10e9/L    Absolute Monocytes 1.1 0.0 - 1.3 10e9/L    Absolute Eosinophils 0.0 0.0 - 0.7 10e9/L    Absolute Basophils 0.0 0.0 - 0.2 10e9/L    Abs Immature Granulocytes 0.1 0 - 0.4 10e9/L    Absolute Nucleated RBC 0.0    Comprehensive metabolic panel   Result Value Ref Range    Sodium 134 133 - 144 mmol/L    Potassium 3.8 3.4 - 5.3 mmol/L    Chloride 99 94 - 109 mmol/L    Carbon Dioxide 27 20 - 32 mmol/L    Anion Gap 8 3 - 14 mmol/L    Glucose 138 (H) 70 - 99 mg/dL    Urea Nitrogen 11 7 - 30 mg/dL    Creatinine 0.79 0.52 - 1.04 mg/dL    GFR  Estimate 70 >60 mL/min/[1.73_m2]    GFR Estimate If Black 81 >60 mL/min/[1.73_m2]    Calcium 8.5 8.5 - 10.1 mg/dL    Bilirubin Total 0.5 0.2 - 1.3 mg/dL    Albumin 2.6 (L) 3.4 - 5.0 g/dL    Protein Total 8.0 6.8 - 8.8 g/dL    Alkaline Phosphatase 142 40 - 150 U/L    ALT 80 (H) 0 - 50 U/L    AST 36 0 - 45 U/L   Lactic acid whole blood   Result Value Ref Range    Lactic Acid 1.9 0.7 - 2.0 mmol/L   Troponin I   Result Value Ref Range    Troponin I ES <0.015 0.000 - 0.045 ug/L   Nt probnp inpatient (BNP)   Result Value Ref Range    N-Terminal Pro BNP Inpatient 861 0 - 1,800 pg/mL   CRP inflammation   Result Value Ref Range    CRP Inflammation 169.0 (H) 0.0 - 8.0 mg/L   Blood gas venous and oxyhgb   Result Value Ref Range    Ph Venous 7.39 7.32 - 7.43 pH    PCO2 Venous 46 40 - 50 mm Hg    PO2 Venous 25 25 - 47 mm Hg    Bicarbonate Venous 28 21 - 28 mmol/L    FIO2  room air      Oxyhemoglobin Venous 37 %    Base Excess Venous 2.1 mmol/L   CT Chest Pulmonary Embolism w Contrast    Narrative    CT CHEST PULMONARY EMBOLISM W CONTRAST    HISTORY: 81 years Female PE suspected, high prob; evalaute expansion  of  known  PEs    TECHNIQUE: Axial CT imaging of the chest was performed With  intravenous contrast. Coronal and sagittal reconstructions were  obtained.    COMPARISON: 6/11/2011    FINDINGS:  There is a moderate-sized pericardial effusion similar to slightly  less that seen previously. Again seen is pulmonary embolism within the  right lower lobe. There is no significant worsening of embolus burden.    There is no evidence of thoracic aortic aneurysm or dissection.  There is no mediastinal or hilar or axillary lymphadenopathy.    Small bilateral pleural effusions are present. Volume of pleural fluid  has increased. There is atelectasis in both lower lobes. There is  linear atelectasis in the lingula and right middle lobe.         No concerning osseous lesions are identified      Impression    IMPRESSION: Pulmonary embolism  to the right lower lobe without  significant change from the recent prior study.    Small bilateral pleural effusions increasing in volume from the prior  study. There is increasing atelectasis.    Persistent pericardial effusion of moderate volume. Volume is  unchanged to slightly less than that seen previously.    LISSETTE FRASER MD       Medications   ondansetron (ZOFRAN) injection 4 mg (4 mg Intravenous Given 6/23/21 1658)   HYDROmorphone (PF) (DILAUDID) injection 0.5 mg (0.5 mg Intravenous Given 6/23/21 1701)   iopamidol (ISOVUE-370) solution 75 mL (100 mLs Intravenous Given 6/23/21 1750)   sodium chloride (PF) 0.9% PF flush 100 mL (100 mLs Intravenous Given 6/23/21 1751)       Assessments & Plan (with Medical Decision Making)   This is a 81 year old female who presents to the ED for evaluation of chest pain.  The patient currently does have active chest pain. Initial ECG does not show a  STEMI or equivalent. Considering her history and examination ACS remains in the  differential. Additional possible etiologies include pulmonary embolism, aortic  dissection, pneumonia, pneumothorax, GERD, PUD, pancreatitis, biliary colic or  cholecystitis, gastritis and esophagitis.  Evaluation and management will include labs, chest x-ray, cardiac monitoring  and symptom alleviation as needed. Disposition pending results of these studies and  patient course.    EKG shows sinus tachycardia, no obvious acute ischemic changes.    Bedside cardiac ultrasound shows mild to moderate pericardial effusion, no obvious signs of tamponade or heart strain.  Decreased cardiac function.    Blood work shows a negative troponin, reassuring for acute ischemic process, BNP is slightly increased from prior, no obvious indication for Lasix.  No noted acidosis, no sign of CO2 retention, normal renal function, no acute electrode abnormality requiring acute correction.  Significantly elevated CRP, lactate is not elevated indicating good perfusion,  no noted anemia, mild leukocytosis again likely secondary to the pericardial effusion query pericarditis.    CT of the chest shows mild to moderate pericardial effusion, perhaps slightly decreased from prior, noted pulmonary emboli, no significant changes from prior.  Patient remains on Eliquis.    Given patient's increase shortness of breath and requiring 2 L nasal cannula to maintain oxygen saturations greater than 90%, patient would likely benefit from admission with close observation and further medical management, patient be transferred to [facility] for further care.    Patient signed out to Dr. Ramon for further care while awaiting transport.    I have reviewed the nursing notes.    I have reviewed the findings, diagnosis, plan and need for follow up with the patient.    New Prescriptions    No medications on file       Final diagnoses:   Pleural effusion   Pericardial effusion   Hypoxia       6/23/2021   [facility]     Jair Gonzalez MD  06/23/21 5004

## 2021-06-24 NOTE — ED NOTES
Condition stable, [facility] here and transferring patient to [facility] in [place].

## 2022-10-13 ENCOUNTER — MEDICAL CORRESPONDENCE (OUTPATIENT)
Dept: HEALTH INFORMATION MANAGEMENT | Facility: HOSPITAL | Age: 82
End: 2022-10-13

## 2022-10-17 ENCOUNTER — HOSPITAL ENCOUNTER (OUTPATIENT)
Dept: BONE DENSITY | Facility: HOSPITAL | Age: 82
Discharge: HOME | End: 2022-10-17
Payer: MEDICARE

## 2022-10-17 DIAGNOSIS — Z78.0 POSTMENOPAUSAL ESTROGEN DEFICIENCY: ICD-10-CM

## 2022-10-17 PROCEDURE — 77080 DXA BONE DENSITY AXIAL: CPT

## 2023-10-20 ENCOUNTER — PREP FOR PROCEDURE (OUTPATIENT)
Dept: SURGERY | Facility: CLINIC | Age: 83
End: 2023-10-20

## 2023-10-20 DIAGNOSIS — T84.012A FAILED TOTAL KNEE, RIGHT, INITIAL ENCOUNTER (H): Primary | ICD-10-CM

## 2023-11-16 RX ORDER — HYDROCHLOROTHIAZIDE 25 MG/1
25 TABLET ORAL EVERY MORNING
COMMUNITY

## 2023-11-16 RX ORDER — LISINOPRIL 5 MG/1
5 TABLET ORAL EVERY MORNING
COMMUNITY

## 2023-11-28 ENCOUNTER — ANESTHESIA EVENT (OUTPATIENT)
Dept: SURGERY | Facility: HOSPITAL | Age: 83
DRG: 468 | End: 2023-11-28
Payer: MEDICARE

## 2023-11-28 RX ORDER — ONDANSETRON 4 MG/1
4 TABLET, ORALLY DISINTEGRATING ORAL EVERY 30 MIN PRN
Status: CANCELLED | OUTPATIENT
Start: 2023-11-28

## 2023-11-28 RX ORDER — ONDANSETRON 2 MG/ML
4 INJECTION INTRAMUSCULAR; INTRAVENOUS EVERY 30 MIN PRN
Status: CANCELLED | OUTPATIENT
Start: 2023-11-28

## 2023-11-28 ASSESSMENT — LIFESTYLE VARIABLES: TOBACCO_USE: 1

## 2023-11-28 NOTE — OR NURSING
Email sent to total joint replacement team as follows;    Tenisha Nelson : 40  MRN [id number]    She is scheduled  for Right Revision Total Knee replacement with Dr. Parry, PCP LARRY Franco CNP.  Tenisha did receive the total joint replacement education packet and will perform the two surgical showers as instructed with hibiclens soap provided.  She plans on going to her home the following morning with assistance from multiple family members; , daughters and daughter in law are planning to take turns to help her.  She has four steps/railings to enter one level home.  She does need a walker.  She states she used crutches before.  She has a tub/shower combo, but if she stays at her daughter's home there's a walk in shower.  She has a high rise toilet seat.    Reviewed the following topics with Tenisha;   Call don't Fall , Visitor policy, Incentive spirometer, possible constipation with narcotic pain medication use, Active Ice system, Wound and dressing care and signs & symptoms of infection to watch for and report right away.

## 2023-11-28 NOTE — ANESTHESIA PREPROCEDURE EVALUATION
Anesthesia Pre-Procedure Evaluation    Patient: Tenisha Nelson   MRN: [id number] : 1940        Procedure : Procedure(s):  Right RevisionTotal Knee Arthroplasty          Past Medical History:   Diagnosis Date     PONV (postoperative nausea and vomiting)       Past Surgical History:   Procedure Laterality Date     COLONOSCOPY      Lambert; polyps     COLONOSCOPY N/A 2019    Procedure: COLONOSCOPY with Polypectomy;  Surgeon: Wero Woodard MD;  Location: [facility]     REPAIR HAMMER TOE Left 2016    Procedure: REPAIR HAMMER TOE;  Surgeon: Celso De La Cruz DPM;  Location: [facility]     REPAIR HAMMER TOE Right 2016    Procedure: REPAIR HAMMER TOE;  Surgeon: Celso De La Cruz DPM;  Location: [facility]      No Known Allergies   Social History     Tobacco Use     Smoking status: Former     Types: Cigarettes     Quit date: 1989     Years since quittin.9     Smokeless tobacco: Former   Substance Use Topics     Alcohol use: Not Currently      Wt Readings from Last 1 Encounters:   21 75.3 kg (166 lb)        Anesthesia Evaluation   Pt has had prior anesthetic.     History of anesthetic complications  - PONV.      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 Please see Dr. Bloom's note from 21 for details on presentation. She underwent a pericardiocentesis in 2021. There was some residual effusion noted posteriorly but overall had good resolution. 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 Started on anticoagulation with Eliquis.    2021 CT ANGIO CHEST I MPRESSION:   1. No evidence of  pulmonary emboli.   2. Pulmonary arterial morphology may reflect pulmonary hypertension.   3. The observed subtle hazy features about the lung fields do not appear to represent atypical pneumonitis.   4. Stable nodularity left lobe thyroid gland projecting into the superior mediastinum consistent with a goiter.       (+) Dyslipidemia hypertension (BP at preop initially was 150/60 with recheck of 142/56 and 136/62)-range: metoprolol taken last friday/ -   -  - -   Taking blood thinners  Instructions Given to patient: hold eliquis 3 days, last taken friday morning.                   dysrhythmias, a-fib,        Previous cardiac testing   Echo: Date: 3/2022 Results:  Normal LV function and wall motion, EF 65%.   Normal RV size and function.   Trace pericardial effusion noted.   Valves: Mild mitral annular calcification, aortic valve mildly calcified- no significant abnormalities on 2D imaging. Spectral and color doppler not performed.     No change in pericardial fat pad or effusion since prior study 9/2021.   For complete doppler study of valves, refer to 9/24/2021 report.     Stress Test:  Date: Results:    ECG Reviewed:  Date: 11/23 Results:  SB with septal infarct age undetermined  Cath:  Date: 2021 Results:      METS/Exercise Tolerance: >4 METS    Hematologic:     (+) History of blood clots (multiple segmental pulmonary emboli without acute cor pulmonale),    pt is anticoagulated, anemia (hx post op anemia),          Musculoskeletal: Comment: Osteopenia  Spondylolisthesis  Neck pain  (+)  arthritis,             GI/Hepatic:  - neg GI/hepatic ROS     Renal/Genitourinary: Comment: Urethral stricture  Urinary incontinence    (+) renal disease, type: CRI,            Endo: Comment: Hx impaired fasting glucose, A1C 6.2 on 10/16/23    (+)               Obesity,       Psychiatric/Substance Use:  - neg psychiatric ROS     Infectious Disease:  - neg infectious disease ROS     Malignancy:  - neg malignancy ROS     Other:  -  "neg other ROS    (+)  , H/O Chronic Pain,         Physical Exam    Airway        Mallampati: III   TM distance: > 3 FB   Neck ROM: full   Mouth opening: > 3 cm    Respiratory Devices and Support         Dental       (+) Modest Abnormalities - crowns, retainers, 1 or 2 missing teeth      Cardiovascular   cardiovascular exam normal       Rhythm and rate: regular and normal     Pulmonary   pulmonary exam normal        breath sounds clear to auscultation       OUTSIDE LABS:  CBC:   Lab Results   Component Value Date    WBC 13.4 (H) 06/23/2021    WBC 11.1 (H) 06/11/2021    HGB 12.2 06/23/2021    HGB 11.9 06/11/2021    HCT 36.5 06/23/2021    HCT 35.7 06/11/2021     (H) 06/23/2021     06/11/2021     BMP:   Lab Results   Component Value Date     06/23/2021     06/11/2021    POTASSIUM 3.8 06/23/2021    POTASSIUM 3.9 06/11/2021    CHLORIDE 99 06/23/2021    CHLORIDE 103 06/11/2021    CO2 27 06/23/2021    CO2 25 06/11/2021    BUN 11 06/23/2021    BUN 11 06/11/2021    CR 0.79 06/23/2021    CR 1.05 (H) 06/11/2021     (H) 06/23/2021     (H) 06/11/2021     COAGS:   Lab Results   Component Value Date    INR 1.11 06/11/2021     POC: No results found for: \"BGM\", \"HCG\", \"HCGS\"  HEPATIC:   Lab Results   Component Value Date    ALBUMIN 2.6 (L) 06/23/2021    PROTTOTAL 8.0 06/23/2021    ALT 80 (H) 06/23/2021    AST 36 06/23/2021    ALKPHOS 142 06/23/2021    BILITOTAL 0.5 06/23/2021     OTHER:   Lab Results   Component Value Date    LACT 1.9 06/23/2021    RODOLFO 8.5 06/23/2021    .0 (H) 06/23/2021       Anesthesia Plan    ASA Status:  3    NPO Status:  NPO Appropriate    Anesthesia Type: Spinal.   Induction: N/a.   Maintenance: N/A.        Consents    Anesthesia Plan(s) and associated risks, benefits, and realistic alternatives discussed. Questions answered and patient/representative(s) expressed understanding.     - Discussed: Risks, Benefits and Alternatives for BOTH SEDATION and the PROCEDURE " were discussed     - Discussed with:  Patient      - Extended Intubation/Ventilatory Support Discussed: No.      - Patient is DNR/DNI Status: No     Use of blood products discussed: No .     Postoperative Care    Pain management: Peripheral nerve block (Single Shot), Neuraxial analgesia, Multi-modal analgesia.   PONV prophylaxis: Ondansetron (or other 5HT-3)     Comments:    Other Comments: HP complete 11/14/23 Huan Franco.              RICKEY Meza CRNA    I have reviewed the pertinent notes and labs in the chart from the past 30 days and (re)examined the patient.  Any updates or changes from those notes are reflected in this note.

## 2023-11-30 ASSESSMENT — ENCOUNTER SYMPTOMS: DYSRHYTHMIAS: 1

## 2023-12-01 RX ORDER — METOPROLOL TARTRATE 25 MG/1
25 TABLET, FILM COATED ORAL DAILY
Status: ON HOLD | COMMUNITY
End: 2023-12-05

## 2023-12-04 ENCOUNTER — HOSPITAL ENCOUNTER (INPATIENT)
Facility: HOSPITAL | Age: 83
LOS: 1 days | Discharge: HOME | DRG: 468 | End: 2023-12-05
Attending: ORTHOPAEDIC SURGERY | Admitting: ORTHOPAEDIC SURGERY
Payer: MEDICARE

## 2023-12-04 ENCOUNTER — APPOINTMENT (OUTPATIENT)
Dept: GENERAL RADIOLOGY | Facility: HOSPITAL | Age: 83
DRG: 468 | End: 2023-12-04
Payer: MEDICARE

## 2023-12-04 ENCOUNTER — APPOINTMENT (OUTPATIENT)
Dept: ULTRASOUND IMAGING | Facility: HOSPITAL | Age: 83
DRG: 468 | End: 2023-12-04
Attending: NURSE ANESTHETIST, CERTIFIED REGISTERED
Payer: MEDICARE

## 2023-12-04 ENCOUNTER — ANESTHESIA (OUTPATIENT)
Dept: SURGERY | Facility: HOSPITAL | Age: 83
DRG: 468 | End: 2023-12-04
Payer: MEDICARE

## 2023-12-04 DIAGNOSIS — Z96.651 STATUS POST REVISION OF TOTAL REPLACEMENT OF RIGHT KNEE: Primary | ICD-10-CM

## 2023-12-04 DIAGNOSIS — T84.012A FAILED TOTAL KNEE, RIGHT, INITIAL ENCOUNTER (H): ICD-10-CM

## 2023-12-04 PROBLEM — Z86.711 HISTORY OF PULMONARY EMBOLISM: Status: ACTIVE | Noted: 2023-12-04

## 2023-12-04 LAB
GLUCOSE BLDC GLUCOMTR-MCNC: 106 MG/DL (ref 70–99)
GRAM STAIN RESULT: ABNORMAL
GRAM STAIN RESULT: ABNORMAL
GRAM STAIN RESULT: NORMAL
GRAM STAIN RESULT: NORMAL

## 2023-12-04 PROCEDURE — 99222 1ST HOSP IP/OBS MODERATE 55: CPT | Performed by: INTERNAL MEDICINE

## 2023-12-04 PROCEDURE — 258N000003 HC RX IP 258 OP 636: Performed by: NURSE ANESTHETIST, CERTIFIED REGISTERED

## 2023-12-04 PROCEDURE — 87075 CULTR BACTERIA EXCEPT BLOOD: CPT | Performed by: ORTHOPAEDIC SURGERY

## 2023-12-04 PROCEDURE — 258N000003 HC RX IP 258 OP 636

## 2023-12-04 PROCEDURE — 27487 REVISE/REPLACE KNEE JOINT: CPT | Performed by: NURSE ANESTHETIST, CERTIFIED REGISTERED

## 2023-12-04 PROCEDURE — 370N000017 HC ANESTHESIA TECHNICAL FEE, PER MIN: Performed by: ORTHOPAEDIC SURGERY

## 2023-12-04 PROCEDURE — 27487 REVISE/REPLACE KNEE JOINT: CPT | Mod: RT | Performed by: ORTHOPAEDIC SURGERY

## 2023-12-04 PROCEDURE — 999N000157 HC STATISTIC RCP TIME EA 10 MIN

## 2023-12-04 PROCEDURE — 120N000001 HC R&B MED SURG/OB

## 2023-12-04 PROCEDURE — C1776 JOINT DEVICE (IMPLANTABLE): HCPCS | Performed by: ORTHOPAEDIC SURGERY

## 2023-12-04 PROCEDURE — 250N000009 HC RX 250: Performed by: ORTHOPAEDIC SURGERY

## 2023-12-04 PROCEDURE — 272N000001 HC OR GENERAL SUPPLY STERILE: Performed by: ORTHOPAEDIC SURGERY

## 2023-12-04 PROCEDURE — 0SPC0JZ REMOVAL OF SYNTHETIC SUBSTITUTE FROM RIGHT KNEE JOINT, OPEN APPROACH: ICD-10-PCS | Performed by: ORTHOPAEDIC SURGERY

## 2023-12-04 PROCEDURE — 64486 TAP BLOCK UNIL BY INJECTION: CPT | Mod: XU | Performed by: NURSE ANESTHETIST, CERTIFIED REGISTERED

## 2023-12-04 PROCEDURE — 999N000065 XR KNEE PORT RIGHT 1/2 VIEWS: Mod: RT

## 2023-12-04 PROCEDURE — 87070 CULTURE OTHR SPECIMN AEROBIC: CPT | Performed by: ORTHOPAEDIC SURGERY

## 2023-12-04 PROCEDURE — 87102 FUNGUS ISOLATION CULTURE: CPT | Performed by: ORTHOPAEDIC SURGERY

## 2023-12-04 PROCEDURE — 250N000011 HC RX IP 250 OP 636: Mod: JZ | Performed by: NURSE ANESTHETIST, CERTIFIED REGISTERED

## 2023-12-04 PROCEDURE — 999N000141 HC STATISTIC PRE-PROCEDURE NURSING ASSESSMENT: Performed by: ORTHOPAEDIC SURGERY

## 2023-12-04 PROCEDURE — 250N000013 HC RX MED GY IP 250 OP 250 PS 637

## 2023-12-04 PROCEDURE — 360N000078 HC SURGERY LEVEL 5, PER MIN: Performed by: ORTHOPAEDIC SURGERY

## 2023-12-04 PROCEDURE — 250N000011 HC RX IP 250 OP 636: Mod: JZ

## 2023-12-04 PROCEDURE — 250N000011 HC RX IP 250 OP 636: Performed by: NURSE ANESTHETIST, CERTIFIED REGISTERED

## 2023-12-04 PROCEDURE — 87205 SMEAR GRAM STAIN: CPT | Performed by: ORTHOPAEDIC SURGERY

## 2023-12-04 PROCEDURE — 250N000009 HC RX 250: Performed by: NURSE ANESTHETIST, CERTIFIED REGISTERED

## 2023-12-04 PROCEDURE — 0SRC06Z REPLACEMENT OF RIGHT KNEE JOINT WITH OXIDIZED ZIRCONIUM ON POLYETHYLENE SYNTHETIC SUBSTITUTE, OPEN APPROACH: ICD-10-PCS | Performed by: ORTHOPAEDIC SURGERY

## 2023-12-04 PROCEDURE — C1713 ANCHOR/SCREW BN/BN,TIS/BN: HCPCS | Performed by: ORTHOPAEDIC SURGERY

## 2023-12-04 PROCEDURE — 99100 ANES PT EXTEME AGE<1 YR&>70: CPT | Performed by: NURSE ANESTHETIST, CERTIFIED REGISTERED

## 2023-12-04 PROCEDURE — 710N000010 HC RECOVERY PHASE 1, LEVEL 2, PER MIN: Performed by: ORTHOPAEDIC SURGERY

## 2023-12-04 DEVICE — IMPLANTABLE DEVICE: Type: IMPLANTABLE DEVICE | Site: KNEE | Status: FUNCTIONAL

## 2023-12-04 DEVICE — BONE CEMENT SIMPLEX W/TOBRAMYCIN 6197-9-001: Type: IMPLANTABLE DEVICE | Site: KNEE | Status: FUNCTIONAL

## 2023-12-04 RX ORDER — NALOXONE HYDROCHLORIDE 0.4 MG/ML
0.2 INJECTION, SOLUTION INTRAMUSCULAR; INTRAVENOUS; SUBCUTANEOUS
Status: DISCONTINUED | OUTPATIENT
Start: 2023-12-04 | End: 2023-12-05 | Stop reason: HOSPADM

## 2023-12-04 RX ORDER — SODIUM CHLORIDE, SODIUM LACTATE, POTASSIUM CHLORIDE, CALCIUM CHLORIDE 600; 310; 30; 20 MG/100ML; MG/100ML; MG/100ML; MG/100ML
INJECTION, SOLUTION INTRAVENOUS CONTINUOUS
Status: DISCONTINUED | OUTPATIENT
Start: 2023-12-04 | End: 2023-12-04 | Stop reason: HOSPADM

## 2023-12-04 RX ORDER — DEXMEDETOMIDINE HYDROCHLORIDE 4 UG/ML
INJECTION, SOLUTION INTRAVENOUS PRN
Status: DISCONTINUED | OUTPATIENT
Start: 2023-12-04 | End: 2023-12-04

## 2023-12-04 RX ORDER — HYDRALAZINE HYDROCHLORIDE 20 MG/ML
2.5-5 INJECTION INTRAMUSCULAR; INTRAVENOUS EVERY 10 MIN PRN
Status: DISCONTINUED | OUTPATIENT
Start: 2023-12-04 | End: 2023-12-04 | Stop reason: HOSPADM

## 2023-12-04 RX ORDER — FENTANYL CITRATE 50 UG/ML
INJECTION, SOLUTION INTRAMUSCULAR; INTRAVENOUS PRN
Status: DISCONTINUED | OUTPATIENT
Start: 2023-12-04 | End: 2023-12-04

## 2023-12-04 RX ORDER — NALOXONE HYDROCHLORIDE 0.4 MG/ML
0.4 INJECTION, SOLUTION INTRAMUSCULAR; INTRAVENOUS; SUBCUTANEOUS
Status: DISCONTINUED | OUTPATIENT
Start: 2023-12-04 | End: 2023-12-05 | Stop reason: HOSPADM

## 2023-12-04 RX ORDER — ONDANSETRON 4 MG/1
4 TABLET, ORALLY DISINTEGRATING ORAL EVERY 30 MIN PRN
Status: DISCONTINUED | OUTPATIENT
Start: 2023-12-04 | End: 2023-12-04 | Stop reason: HOSPADM

## 2023-12-04 RX ORDER — HYDROMORPHONE HYDROCHLORIDE 1 MG/ML
0.2 INJECTION, SOLUTION INTRAMUSCULAR; INTRAVENOUS; SUBCUTANEOUS EVERY 5 MIN PRN
Status: DISCONTINUED | OUTPATIENT
Start: 2023-12-04 | End: 2023-12-04 | Stop reason: HOSPADM

## 2023-12-04 RX ORDER — ALBUTEROL SULFATE 0.83 MG/ML
2.5 SOLUTION RESPIRATORY (INHALATION) EVERY 4 HOURS PRN
Status: DISCONTINUED | OUTPATIENT
Start: 2023-12-04 | End: 2023-12-04 | Stop reason: HOSPADM

## 2023-12-04 RX ORDER — OXYCODONE HYDROCHLORIDE 5 MG/1
5-10 TABLET ORAL EVERY 4 HOURS PRN
Qty: 30 TABLET | Refills: 0 | Status: SHIPPED | OUTPATIENT
Start: 2023-12-04

## 2023-12-04 RX ORDER — ACETAMINOPHEN 325 MG/1
975 TABLET ORAL EVERY 8 HOURS
Status: DISCONTINUED | OUTPATIENT
Start: 2023-12-04 | End: 2023-12-05 | Stop reason: HOSPADM

## 2023-12-04 RX ORDER — PROPOFOL 10 MG/ML
INJECTION, EMULSION INTRAVENOUS CONTINUOUS PRN
Status: DISCONTINUED | OUTPATIENT
Start: 2023-12-04 | End: 2023-12-04

## 2023-12-04 RX ORDER — OXYCODONE HYDROCHLORIDE 5 MG/1
5 TABLET ORAL EVERY 4 HOURS PRN
Status: DISCONTINUED | OUTPATIENT
Start: 2023-12-04 | End: 2023-12-05 | Stop reason: HOSPADM

## 2023-12-04 RX ORDER — HYDROMORPHONE HCL IN WATER/PF 6 MG/30 ML
0.2 PATIENT CONTROLLED ANALGESIA SYRINGE INTRAVENOUS
Status: DISCONTINUED | OUTPATIENT
Start: 2023-12-04 | End: 2023-12-05 | Stop reason: HOSPADM

## 2023-12-04 RX ORDER — FENTANYL CITRATE 50 UG/ML
25 INJECTION, SOLUTION INTRAMUSCULAR; INTRAVENOUS EVERY 5 MIN PRN
Status: DISCONTINUED | OUTPATIENT
Start: 2023-12-04 | End: 2023-12-04 | Stop reason: HOSPADM

## 2023-12-04 RX ORDER — KETAMINE HYDROCHLORIDE 10 MG/ML
INJECTION INTRAMUSCULAR; INTRAVENOUS PRN
Status: DISCONTINUED | OUTPATIENT
Start: 2023-12-04 | End: 2023-12-04

## 2023-12-04 RX ORDER — OXYCODONE HYDROCHLORIDE 5 MG/1
10 TABLET ORAL EVERY 4 HOURS PRN
Status: DISCONTINUED | OUTPATIENT
Start: 2023-12-04 | End: 2023-12-05 | Stop reason: HOSPADM

## 2023-12-04 RX ORDER — HYDROCHLOROTHIAZIDE 25 MG/1
25 TABLET ORAL DAILY
Status: DISCONTINUED | OUTPATIENT
Start: 2023-12-04 | End: 2023-12-05 | Stop reason: HOSPADM

## 2023-12-04 RX ORDER — AMOXICILLIN 250 MG
1 CAPSULE ORAL 2 TIMES DAILY
Status: DISCONTINUED | OUTPATIENT
Start: 2023-12-04 | End: 2023-12-05 | Stop reason: HOSPADM

## 2023-12-04 RX ORDER — ACETAMINOPHEN 325 MG/1
650 TABLET ORAL EVERY 4 HOURS PRN
Qty: 100 TABLET | Refills: 0 | Status: SHIPPED | OUTPATIENT
Start: 2023-12-04

## 2023-12-04 RX ORDER — BUPIVACAINE HYDROCHLORIDE 2.5 MG/ML
INJECTION, SOLUTION EPIDURAL; INFILTRATION; INTRACAUDAL
Status: COMPLETED | OUTPATIENT
Start: 2023-12-04 | End: 2023-12-04

## 2023-12-04 RX ORDER — UREA 10 %
500 LOTION (ML) TOPICAL AT BEDTIME
Status: DISCONTINUED | OUTPATIENT
Start: 2023-12-04 | End: 2023-12-05 | Stop reason: HOSPADM

## 2023-12-04 RX ORDER — DEXAMETHASONE SODIUM PHOSPHATE 4 MG/ML
INJECTION, SOLUTION INTRA-ARTICULAR; INTRALESIONAL; INTRAMUSCULAR; INTRAVENOUS; SOFT TISSUE PRN
Status: DISCONTINUED | OUTPATIENT
Start: 2023-12-04 | End: 2023-12-04

## 2023-12-04 RX ORDER — LISINOPRIL 5 MG/1
5 TABLET ORAL DAILY
Status: DISCONTINUED | OUTPATIENT
Start: 2023-12-04 | End: 2023-12-05 | Stop reason: HOSPADM

## 2023-12-04 RX ORDER — BUPIVACAINE HYDROCHLORIDE 7.5 MG/ML
INJECTION, SOLUTION INTRASPINAL PRN
Status: DISCONTINUED | OUTPATIENT
Start: 2023-12-04 | End: 2023-12-04

## 2023-12-04 RX ORDER — ONDANSETRON 2 MG/ML
INJECTION INTRAMUSCULAR; INTRAVENOUS PRN
Status: DISCONTINUED | OUTPATIENT
Start: 2023-12-04 | End: 2023-12-04

## 2023-12-04 RX ORDER — ONDANSETRON 2 MG/ML
4 INJECTION INTRAMUSCULAR; INTRAVENOUS EVERY 6 HOURS PRN
Status: DISCONTINUED | OUTPATIENT
Start: 2023-12-04 | End: 2023-12-05 | Stop reason: HOSPADM

## 2023-12-04 RX ORDER — CEFAZOLIN SODIUM/WATER 2 G/20 ML
2 SYRINGE (ML) INTRAVENOUS SEE ADMIN INSTRUCTIONS
Status: DISCONTINUED | OUTPATIENT
Start: 2023-12-04 | End: 2023-12-04 | Stop reason: HOSPADM

## 2023-12-04 RX ORDER — SODIUM CHLORIDE, SODIUM LACTATE, POTASSIUM CHLORIDE, CALCIUM CHLORIDE 600; 310; 30; 20 MG/100ML; MG/100ML; MG/100ML; MG/100ML
INJECTION, SOLUTION INTRAVENOUS CONTINUOUS
Status: DISCONTINUED | OUTPATIENT
Start: 2023-12-04 | End: 2023-12-05 | Stop reason: HOSPADM

## 2023-12-04 RX ORDER — CEFAZOLIN SODIUM 2 G/100ML
2 INJECTION, SOLUTION INTRAVENOUS EVERY 8 HOURS
Qty: 200 ML | Refills: 0 | Status: COMPLETED | OUTPATIENT
Start: 2023-12-04 | End: 2023-12-05

## 2023-12-04 RX ORDER — LABETALOL 20 MG/4 ML (5 MG/ML) INTRAVENOUS SYRINGE
10
Status: DISCONTINUED | OUTPATIENT
Start: 2023-12-04 | End: 2023-12-04 | Stop reason: HOSPADM

## 2023-12-04 RX ORDER — ONDANSETRON 2 MG/ML
4 INJECTION INTRAMUSCULAR; INTRAVENOUS EVERY 30 MIN PRN
Status: DISCONTINUED | OUTPATIENT
Start: 2023-12-04 | End: 2023-12-04 | Stop reason: HOSPADM

## 2023-12-04 RX ORDER — ACETAMINOPHEN 325 MG/1
650 TABLET ORAL EVERY 4 HOURS PRN
Status: DISCONTINUED | OUTPATIENT
Start: 2023-12-07 | End: 2023-12-05 | Stop reason: HOSPADM

## 2023-12-04 RX ORDER — OXYBUTYNIN CHLORIDE 5 MG/1
5 TABLET, EXTENDED RELEASE ORAL DAILY
Status: DISCONTINUED | OUTPATIENT
Start: 2023-12-04 | End: 2023-12-05 | Stop reason: HOSPADM

## 2023-12-04 RX ORDER — LIDOCAINE 40 MG/G
CREAM TOPICAL
Status: DISCONTINUED | OUTPATIENT
Start: 2023-12-04 | End: 2023-12-05 | Stop reason: HOSPADM

## 2023-12-04 RX ORDER — BUPIVACAINE HYDROCHLORIDE AND EPINEPHRINE 2.5; 5 MG/ML; UG/ML
INJECTION, SOLUTION INFILTRATION; PERINEURAL PRN
Status: DISCONTINUED | OUTPATIENT
Start: 2023-12-04 | End: 2023-12-04 | Stop reason: HOSPADM

## 2023-12-04 RX ORDER — ONDANSETRON 4 MG/1
4 TABLET, ORALLY DISINTEGRATING ORAL EVERY 6 HOURS PRN
Status: DISCONTINUED | OUTPATIENT
Start: 2023-12-04 | End: 2023-12-05 | Stop reason: HOSPADM

## 2023-12-04 RX ORDER — FENTANYL CITRATE 50 UG/ML
50 INJECTION, SOLUTION INTRAMUSCULAR; INTRAVENOUS EVERY 5 MIN PRN
Status: DISCONTINUED | OUTPATIENT
Start: 2023-12-04 | End: 2023-12-04 | Stop reason: HOSPADM

## 2023-12-04 RX ORDER — HYDROMORPHONE HYDROCHLORIDE 1 MG/ML
0.4 INJECTION, SOLUTION INTRAMUSCULAR; INTRAVENOUS; SUBCUTANEOUS EVERY 5 MIN PRN
Status: DISCONTINUED | OUTPATIENT
Start: 2023-12-04 | End: 2023-12-04 | Stop reason: HOSPADM

## 2023-12-04 RX ORDER — PROCHLORPERAZINE MALEATE 5 MG
5 TABLET ORAL EVERY 6 HOURS PRN
Status: DISCONTINUED | OUTPATIENT
Start: 2023-12-04 | End: 2023-12-05 | Stop reason: HOSPADM

## 2023-12-04 RX ORDER — LIDOCAINE 40 MG/G
CREAM TOPICAL
Status: DISCONTINUED | OUTPATIENT
Start: 2023-12-04 | End: 2023-12-04 | Stop reason: HOSPADM

## 2023-12-04 RX ORDER — CEFAZOLIN SODIUM/WATER 2 G/20 ML
2 SYRINGE (ML) INTRAVENOUS
Status: COMPLETED | OUTPATIENT
Start: 2023-12-04 | End: 2023-12-04

## 2023-12-04 RX ORDER — PROPOFOL 10 MG/ML
INJECTION, EMULSION INTRAVENOUS PRN
Status: DISCONTINUED | OUTPATIENT
Start: 2023-12-04 | End: 2023-12-04

## 2023-12-04 RX ORDER — METOPROLOL TARTRATE 25 MG/1
25 TABLET, FILM COATED ORAL DAILY
Status: DISCONTINUED | OUTPATIENT
Start: 2023-12-04 | End: 2023-12-05 | Stop reason: HOSPADM

## 2023-12-04 RX ORDER — POLYETHYLENE GLYCOL 3350 17 G/17G
17 POWDER, FOR SOLUTION ORAL DAILY
Status: DISCONTINUED | OUTPATIENT
Start: 2023-12-05 | End: 2023-12-05 | Stop reason: HOSPADM

## 2023-12-04 RX ORDER — HYDROMORPHONE HCL IN WATER/PF 6 MG/30 ML
0.4 PATIENT CONTROLLED ANALGESIA SYRINGE INTRAVENOUS
Status: DISCONTINUED | OUTPATIENT
Start: 2023-12-04 | End: 2023-12-05 | Stop reason: HOSPADM

## 2023-12-04 RX ORDER — BISACODYL 10 MG
10 SUPPOSITORY, RECTAL RECTAL DAILY PRN
Status: DISCONTINUED | OUTPATIENT
Start: 2023-12-04 | End: 2023-12-05 | Stop reason: HOSPADM

## 2023-12-04 RX ORDER — LIDOCAINE HYDROCHLORIDE 20 MG/ML
INJECTION, SOLUTION INFILTRATION; PERINEURAL PRN
Status: DISCONTINUED | OUTPATIENT
Start: 2023-12-04 | End: 2023-12-04

## 2023-12-04 RX ORDER — BUPIVACAINE HYDROCHLORIDE AND EPINEPHRINE 2.5; 5 MG/ML; UG/ML
INJECTION, SOLUTION EPIDURAL; INFILTRATION; INTRACAUDAL; PERINEURAL
Status: DISCONTINUED
Start: 2023-12-04 | End: 2023-12-04 | Stop reason: HOSPADM

## 2023-12-04 RX ADMIN — CEFAZOLIN SODIUM 2 G: 2 INJECTION, SOLUTION INTRAVENOUS at 20:11

## 2023-12-04 RX ADMIN — PROPOFOL 75 MCG/KG/MIN: 10 INJECTION, EMULSION INTRAVENOUS at 11:51

## 2023-12-04 RX ADMIN — DEXAMETHASONE SODIUM PHOSPHATE 10 MG: 4 INJECTION, SOLUTION INTRA-ARTICULAR; INTRALESIONAL; INTRAMUSCULAR; INTRAVENOUS; SOFT TISSUE at 12:18

## 2023-12-04 RX ADMIN — Medication 10 MCG: at 13:49

## 2023-12-04 RX ADMIN — ACETAMINOPHEN 975 MG: 325 TABLET, FILM COATED ORAL at 16:05

## 2023-12-04 RX ADMIN — FENTANYL CITRATE 50 MCG: 50 INJECTION INTRAMUSCULAR; INTRAVENOUS at 14:45

## 2023-12-04 RX ADMIN — PROPOFOL 30 MG: 10 INJECTION, EMULSION INTRAVENOUS at 11:51

## 2023-12-04 RX ADMIN — ONDANSETRON 4 MG: 2 INJECTION INTRAMUSCULAR; INTRAVENOUS at 15:17

## 2023-12-04 RX ADMIN — SODIUM CHLORIDE, POTASSIUM CHLORIDE, SODIUM LACTATE AND CALCIUM CHLORIDE: 600; 310; 30; 20 INJECTION, SOLUTION INTRAVENOUS at 12:21

## 2023-12-04 RX ADMIN — Medication 25 MG: at 13:41

## 2023-12-04 RX ADMIN — OXYCODONE HYDROCHLORIDE 5 MG: 5 TABLET ORAL at 16:17

## 2023-12-04 RX ADMIN — MIDAZOLAM 1 MG: 1 INJECTION INTRAMUSCULAR; INTRAVENOUS at 10:23

## 2023-12-04 RX ADMIN — Medication 5 MCG: at 12:26

## 2023-12-04 RX ADMIN — Medication 10 MCG: at 12:42

## 2023-12-04 RX ADMIN — HYDROCHLOROTHIAZIDE 25 MG: 25 TABLET ORAL at 16:17

## 2023-12-04 RX ADMIN — FENTANYL CITRATE 50 MCG: 50 INJECTION INTRAMUSCULAR; INTRAVENOUS at 12:17

## 2023-12-04 RX ADMIN — PROPOFOL 25 MG: 10 INJECTION, EMULSION INTRAVENOUS at 14:09

## 2023-12-04 RX ADMIN — PHENYLEPHRINE HYDROCHLORIDE 100 MCG: 10 INJECTION INTRAVENOUS at 12:08

## 2023-12-04 RX ADMIN — FENTANYL CITRATE 50 MCG: 50 INJECTION INTRAMUSCULAR; INTRAVENOUS at 11:37

## 2023-12-04 RX ADMIN — SODIUM CHLORIDE, POTASSIUM CHLORIDE, SODIUM LACTATE AND CALCIUM CHLORIDE: 600; 310; 30; 20 INJECTION, SOLUTION INTRAVENOUS at 10:13

## 2023-12-04 RX ADMIN — BUPIVACAINE HYDROCHLORIDE IN DEXTROSE 1.5 ML: 7.5 INJECTION, SOLUTION SUBARACHNOID at 11:48

## 2023-12-04 RX ADMIN — BUPIVACAINE HYDROCHLORIDE 10 ML: 2.5 INJECTION, SOLUTION EPIDURAL; INFILTRATION; INTRACAUDAL at 10:18

## 2023-12-04 RX ADMIN — OXYCODONE HYDROCHLORIDE 10 MG: 5 TABLET ORAL at 20:19

## 2023-12-04 RX ADMIN — BUPIVACAINE HYDROCHLORIDE 5 ML: 2.5 INJECTION, SOLUTION EPIDURAL; INFILTRATION; INTRACAUDAL at 10:25

## 2023-12-04 RX ADMIN — MIDAZOLAM 1 MG: 1 INJECTION INTRAMUSCULAR; INTRAVENOUS at 10:17

## 2023-12-04 RX ADMIN — Medication 2 G: at 11:27

## 2023-12-04 RX ADMIN — Medication 5 MCG: at 12:32

## 2023-12-04 RX ADMIN — LISINOPRIL 5 MG: 5 TABLET ORAL at 16:17

## 2023-12-04 RX ADMIN — HYDROMORPHONE HYDROCHLORIDE 0.4 MG: 0.2 INJECTION, SOLUTION INTRAMUSCULAR; INTRAVENOUS; SUBCUTANEOUS at 20:11

## 2023-12-04 RX ADMIN — BUPIVACAINE HYDROCHLORIDE 15 ML: 2.5 INJECTION, SOLUTION EPIDURAL; INFILTRATION; INTRACAUDAL at 10:18

## 2023-12-04 RX ADMIN — PROPOFOL 20 MG: 10 INJECTION, EMULSION INTRAVENOUS at 13:33

## 2023-12-04 RX ADMIN — PROPOFOL 25 MG: 10 INJECTION, EMULSION INTRAVENOUS at 14:01

## 2023-12-04 RX ADMIN — SODIUM CHLORIDE, POTASSIUM CHLORIDE, SODIUM LACTATE AND CALCIUM CHLORIDE: 600; 310; 30; 20 INJECTION, SOLUTION INTRAVENOUS at 16:07

## 2023-12-04 RX ADMIN — Medication 10 MCG: at 13:20

## 2023-12-04 RX ADMIN — METOPROLOL TARTRATE 25 MG: 25 TABLET, FILM COATED ORAL at 16:17

## 2023-12-04 RX ADMIN — OXYBUTYNIN CHLORIDE 5 MG: 5 TABLET, EXTENDED RELEASE ORAL at 16:17

## 2023-12-04 RX ADMIN — LIDOCAINE HYDROCHLORIDE 40 MG: 20 INJECTION, SOLUTION INFILTRATION; PERINEURAL at 11:51

## 2023-12-04 RX ADMIN — FENTANYL CITRATE 25 MCG: 50 INJECTION INTRAMUSCULAR; INTRAVENOUS at 13:01

## 2023-12-04 RX ADMIN — PROPOFOL 30 MG: 10 INJECTION, EMULSION INTRAVENOUS at 12:47

## 2023-12-04 RX ADMIN — FENTANYL CITRATE 25 MCG: 50 INJECTION INTRAMUSCULAR; INTRAVENOUS at 13:11

## 2023-12-04 RX ADMIN — ONDANSETRON 4 MG: 2 INJECTION INTRAMUSCULAR; INTRAVENOUS at 11:33

## 2023-12-04 RX ADMIN — PROPOFOL 30 MG: 10 INJECTION, EMULSION INTRAVENOUS at 12:09

## 2023-12-04 RX ADMIN — FENTANYL CITRATE 50 MCG: 50 INJECTION INTRAMUSCULAR; INTRAVENOUS at 15:00

## 2023-12-04 ASSESSMENT — ACTIVITIES OF DAILY LIVING (ADL)
ADLS_ACUITY_SCORE: 37
ADLS_ACUITY_SCORE: 35
ADLS_ACUITY_SCORE: 31
ADLS_ACUITY_SCORE: 35
ADLS_ACUITY_SCORE: 25

## 2023-12-04 NOTE — PLAN OF CARE
[facility] Inpatient Admission Note:    Patient admitted to 3106/3106-1 at approximately 1608 via bed accompanied by nurse from surgery . Report received from Lori COOK RN in SBAR format at 1608 via face to face in room. Patient arrived in bed via PACU team. Patient is alert and oriented X 3, reports pain; rates at 5 on 0-10 scale.  Patient oriented to room, unit, hourly rounding, and plan of care. Explained admission packet and patient handbook with patient bill of rights brochure. Will continue to monitor and document as needed.     Inpatient Nursing criteria listed below was met:    Health care directives status obtained and documented: Yes    Patient identifies a surrogate decision maker: No      If initial lactic acid greater than 2.0, repeat lactic acid drawn within one hour of arrival to unit: NA.   Clergy visit ordered if patient requests: N/A    Skin issues/needs documented: Yes    Isolation Patient: no Education given, correct sign in place and documentation row added to PCS:   N/A    Fall Prevention Yes: Care plan updated, education given and documented, sticker and magnet in place: Yes    Care Plan initiated: Yes    Education Documented (including assessment): Yes    Patient has discharge needs : Yes If yes, please explain: Mobility

## 2023-12-04 NOTE — OR NURSING
Face to face report given with opportunity to observe patient.    Report given to Isra Ordoñez RN   12/4/2023  3:47 PM

## 2023-12-04 NOTE — ANESTHESIA CARE TRANSFER NOTE
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    Anesthesia Type:   Spinal     Note:    Oropharynx: oropharynx clear of all foreign objects and spontaneously breathing  Level of Consciousness: awake  Oxygen Supplementation: room air    Independent Airway: airway patency satisfactory and stable  Dentition: dentition unchanged  Vital Signs Stable: post-procedure vital signs reviewed and stable  Report to RN Given: handoff report given  Patient transferred to: PACU    Handoff Report: Identifed the Patient, Identified the Reponsible Provider, Reviewed the pertinent medical history, Discussed the surgical course, Reviewed Intra-OP anesthesia mangement and issues during anesthesia, Set expectations for post-procedure period and Allowed opportunity for questions and acknowledgement of understanding  Vitals:  Vitals Value Taken Time   /59 12/04/23 1429   Temp     Pulse 66 12/04/23 1434   Resp 14 12/04/23 1434   SpO2 92 % 12/04/23 1434   Vitals shown include unfiled device data.    Electronically Signed By: RICKEY CARTER CRNA  December 4, 2023  2:34 PM

## 2023-12-04 NOTE — ANESTHESIA POSTPROCEDURE EVALUATION
Patient: Tenisha Nelson    Procedure: Procedure(s):  Right RevisionTotal Knee Arthroplasty       Anesthesia Type:  Spinal    Note:  Disposition: Admission   Postop Pain Control: Uneventful            Sign Out: Well controlled pain   PONV: No   Neuro/Psych: Uneventful            Sign Out: Acceptable/Baseline neuro status   Airway/Respiratory: Uneventful            Sign Out: Acceptable/Baseline resp. status   CV/Hemodynamics: Uneventful            Sign Out: Acceptable CV status; No obvious hypovolemia; No obvious fluid overload   Other NRE: NONE   DID A NON-ROUTINE EVENT OCCUR? No       Last vitals:  Vitals Value Taken Time   /80 12/04/23 1520   Temp 97.2  F (36.2  C) 12/04/23 1440   Pulse 71 12/04/23 1523   Resp 13 12/04/23 1523   SpO2 97 % 12/04/23 1524   Vitals shown include unfiled device data.    Electronically Signed By: RICKEY Sands CRNA  December 4, 2023  5:34 PM

## 2023-12-04 NOTE — PLAN OF CARE
"Reason for hospital stay:  Total Right Knee Arthroplasty  Living situation PTA: Home  Most recent vitals: /61 (BP Location: Left arm, Patient Position: Semi-Sykes's, Cuff Size: Adult Large)   Pulse 68   Temp 98.5  F (36.9  C) (Tympanic)   Resp 16   Ht 1.626 m (5' 4\")   Wt 79.6 kg (175 lb 6.4 oz)   SpO2 95%   BMI 30.11 kg/m        Pain Management:  Rated pain at 5/10 on pain scale upon arrival to floor. Oxycodone 5 mg given   LOC:  A&O x 4  Cardiac:  HRR regular. Hypertensive upon initial assessment. Pt has not taken any heart medications for about a week. Scheduled Metoprolol and Lisinopril given.  Respiratory:  Lungs clear and equal bilat. O2 92-96% on RA.  GI:  Normoactive BS x 4  :  Voids w/o difficulty  Skin Issues:  Some scattered bruising present. Incision to right Knee, unable to visualize due to surgical bandaging in place. CDI.    IVF:  LR @ 75 mL/hr.  ABX:  Ancef    Nutrition: Regular diet w/ good appetite.  Activity: Sat at edge of bed this shift  Safety:  bed in lowest position, wheels locked and alarms in place. Call light and personal items within reach and makes needs known. Room near nurses station w/ door open and frequent rounding. Daughter and daughter-in-law at bedside.  Face to face report given with opportunity to observe patient.    Report given to LETY Kilgore RN   12/4/2023  7:19 PM      "

## 2023-12-04 NOTE — OP NOTE
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  I have followed her in the clinic for a while and she developed pain.  She had x-ray and bone scan that was concerning for aseptic loosening.  She had posterior subluxation of her femur on the tibia with severe polyethylene wear.  Given this finding I recommended revision total knee arthroplasty.  Her inflammatory markers preoperatively were negative.  I discussed the procedure, risks and benefits in detail.  Consent was obtained    Description of Procedure:   The patient was seen in the preoperative area.  Her surgical site was marked and questions were answered.  She was placed under peripheral nerve blocks and spinal anesthetic.  Her right leg was prepped with alcohol, ChloraPrep and draped in a sterile fashion.  A timeout was called identify the correct patient correct surgical site.  The limb was then elevated and exsanguinated tourniquet was inflated 250 mmHg.  I utilized the prior incision and extended proximally and distally.  I then did a  medial parapatellar arthrotomy and cultured the synovial fluid.  A thorough and extensive synovectomy was then completed given the severe metallosis that was noted.  I then flexed the knee and was able to dislodge the implant from the cement mantle and remove the femoral implant without significant bone loss.  Following this I did the same in the tibia with mild amount of posterior lateral bone loss.  I then used the extra medullary tibial guide and freshen the cut in the proximal tibia.  I then sized the tibia to a size 3 and started reaming the tibial canal up to the appropriate size.  The knee was flexed further and I placed a size 5 femoral component.  I did have to augment posteriorly in both the medial and lateral aspects of the condyles and I put a 5 mm distal femoral augment down as well.  These appropriate chamfer cuts and augment cuts were made.  The trial implant was then placed.  The knee came out to full extension with a 15 mm insert and flex nicely to 125.  I then removed the trial implants thoroughly clean the bone ends with Irrisept and pulse lavage irrigation.  I then cemented the final tibial and femoral components in place and chose a size 50 mm polyethylene insert.  The patella tracked midline and the knee came through range of motion from full extension to 125 degrees of knee flexion.  The tourniquet was deflated and hemostasis was achieved.  The retinaculum was closed with a combination of interrupted #1 Vicryl stitches and a running #1 strata fix stitch.  The subcutaneous cutaneous tissue was closed with 2-0 Vicryl and the skin was closed with staples.  A sterile dressing was applied.  She was awakened and transferred to PACU in stable condition    Post Operative Plan:   The patient will be admitted overnight.  Anticipate discharge home tomorrow.  She will likely be discharged with home physical therapy and follow-up in 2 weeks for a wound check and staple removal.  We also get x-rays 3 views  right knee.

## 2023-12-04 NOTE — ANESTHESIA PROCEDURE NOTES
IPACK Procedure Note    Pre-Procedure   Staff -        CRNA: Alicia Amin APRN CRNA       Performed By: CRNA       Procedure Start/Stop Times: 12/4/2023 10:18 AM and 12/4/2023 10:21 AM       Pre-Anesthestic Checklist: patient identified, IV checked, site marked, risks and benefits discussed, informed consent, monitors and equipment checked, pre-op evaluation, at physician/surgeon's request and post-op pain management  Timeout:       Correct Patient: Yes        Correct Procedure: Yes        Correct Site: Yes        Correct Position: Yes        Correct Laterality: Yes        Site Marked: Yes  Procedure Documentation  Procedure: IPACK       Diagnosis: RIGHT TKA       Laterality: right       Patient Position: supine       Skin prep: Chloraprep       Needle Type: short bevel and insulated       Needle Gauge: 20.        Needle Length (Inches): 4        Ultrasound guided       1. Ultrasound was used to identify targeted nerve, plexus, vascular marker, or fascial plane and place a needle adjacent to it in real-time.       2. Ultrasound was used to visualize the spread of anesthetic in close proximity to the above referenced structure.       3. A permanent image is entered into the patient's record.       4. The visualized anatomic structures appeared normal.       5. There were no apparent abnormal pathologic findings.    Assessment/Narrative         The placement was negative for: blood aspirated, painful injection and site bleeding       Paresthesias: No.       Bolus given via needle..        Secured via.        Insertion/Infusion Method: Single Shot       Complications: none       Injection made incrementally with aspirations every 5 mL.    Medication(s) Administered   Bupivacaine 0.25% PF (Infiltration) - Infiltration   10 mL - 12/4/2023 10:18:00 AM  Medication Administration Time: 12/4/2023 10:18 AM     Comments:  With 7ml PF saline added       FOR [facility] ([facility]) ONLY:   Pain Team Contact information: please  "page the Pain Team Via [facility]. Search \"Pain\". During daytime hours, please page the attending first. At night please page the resident first.      "

## 2023-12-04 NOTE — OR NURSING
PACU Respiratory Event Documentation     1) Episodes of Apnea greater than or equal to 10 seconds: 0    2) Bradypnea - less than 8 breaths per minute: 0    3) Pain score on 0 to 10 scale: 4    4) Pain-sedation mismatch (yes or no): no    5) Repeated 02 desaturation less than 90% (yes or no): yes    Anesthesia notified? (yes or no): na    Any of the above events occuring repeatedly in separate 30 minute intervals may be considered recurrent PACU respiratory events.

## 2023-12-04 NOTE — ANESTHESIA PROCEDURE NOTES
TAP Procedure Note    Pre-Procedure   Staff -        CRNA: Alicia Amin APRN CRNA       Performed By: CRNA       Location: pre-op       Procedure Start/Stop Times: 12/4/2023 10:17 AM and 12/4/2023 10:18 AM       Pre-Anesthestic Checklist: patient identified, IV checked, site marked, risks and benefits discussed, informed consent, monitors and equipment checked, pre-op evaluation, at physician/surgeon's request and post-op pain management  Timeout:       Correct Patient: Yes        Correct Procedure: Yes        Correct Site: Yes        Correct Position: Yes        Correct Laterality: Yes        Site Marked: Yes  Procedure Documentation  Procedure: TAP       Diagnosis: RIGHT TKA       Laterality: right       Patient Position: supine       Skin prep: Chloraprep       Needle Type: insulated and short bevel       Needle Gauge: 20.        Needle Length (Inches): 4                 Ultrasound guided       1. Ultrasound was used to identify targeted nerve, plexus, vascular marker, or fascial plane and place a needle adjacent to it in real-time.       2. Ultrasound was used to visualize the spread of anesthetic in close proximity to the above referenced structure.       3. A permanent image is entered into the patient's record.       4. The visualized anatomic structures appeared normal.       5. There were no apparent abnormal pathologic findings.    Assessment/Narrative         The placement was negative for: blood aspirated, painful injection and site bleeding       Paresthesias: No.       Bolus given via needle..        Secured via.        Insertion/Infusion Method: Single Shot       Complications: none       Injection made incrementally with aspirations every 5 mL.    Medication(s) Administered   Bupivacaine 0.25% PF (Infiltration) - Infiltration   15 mL - 12/4/2023 10:18:00 AM  Medication Administration Time: 12/4/2023 10:17 AM     Comments:  With 5ml PF saline      FOR [facility] ([facility] ONLY:   Pain Team Contact  "information: please page the Pain Team Via [facility]. Search \"Pain\". During daytime hours, please page the attending first. At night please page the resident first.      "

## 2023-12-04 NOTE — ANESTHESIA PROCEDURE NOTES
"Intrathecal injection Procedure Note    Pre-Procedure   Staff -        CRNA: Alicia Aimn APRN CRNA       Performed By: CRNA       Location: OR       Procedure Start/Stop Times: 12/4/2023 11:40 AM and 12/4/2023 11:48 AM       Pre-Anesthestic Checklist: patient identified, IV checked, risks and benefits discussed, informed consent, monitors and equipment checked, pre-op evaluation, at physician/surgeon's request and post-op pain management  Timeout:       Correct Patient: Yes        Correct Procedure: Yes        Correct Site: Yes        Correct Position: Yes   Procedure Documentation  Procedure: intrathecal injection       Diagnosis: right TKA       Patient Position: sitting       Patient Prep/Sterile Barriers: sterile gloves, mask, patient draped       Skin prep: Betadine and Chloraprep       Insertion Site: L3-4. (midline approach).       Needle Gauge: 25.        Needle Length (Inches): 3.5        Spinal Needle Type: Pencan       Introducer used       # of attempts: 1 and  # of redirects:  1    Assessment/Narrative         Paresthesias: No.       CSF fluid: clear.    Medication(s) Administered   Medication Administration Time: 12/4/2023 11:40 AM     Comments:  >72 hours since last eliquis dose; sitting for spinal, chloraprep followed by betadine prep.  Palpation of spinous process.  Introducer placed followed by spinal needle.  Encountered lamina on left with initial pass, redirected to center with + CSF before, during, and after local placement for spinal.  Pt then returned to supine position with assist of 2.       FOR [facility] ([facility]) ONLY:   Pain Team Contact information: please page the Pain Team Via [employer]. Search \"Pain\". During daytime hours, please page the attending first. At night please page the resident first.      "

## 2023-12-04 NOTE — ANESTHESIA PROCEDURE NOTES
"Genicular Procedure Note    Pre-Procedure   Staff -        CRNA: Alicia Amin APRN CRNA       Performed By: CRNA       Location: pre-op       Procedure Start/Stop Times: 12/4/2023 10:21 AM and 12/4/2023 10:25 AM  Timeout:       Correct Patient: Yes        Correct Procedure: Yes        Correct Site: Yes        Correct Position: Yes        Correct Laterality: Yes        Site Marked: Yes  Procedure Documentation  Procedure: Genicular       Diagnosis: RIGHT TKA       Laterality: right       Patient Position: supine       Skin prep: Chloraprep       Needle Type: short bevel and insulated       Needle Gauge: 20.        Needle Length (Inches): 4        Ultrasound guided       1. Ultrasound was used to identify targeted nerve, plexus, vascular marker, or fascial plane and place a needle adjacent to it in real-time.       2. Ultrasound was used to visualize the spread of anesthetic in close proximity to the above referenced structure.       3. A permanent image is entered into the patient's record.       4. The visualized anatomic structures appeared normal.       5. There were no apparent abnormal pathologic findings.    Assessment/Narrative         The placement was negative for: blood aspirated, painful injection and site bleeding       Paresthesias: No.       Bolus given via needle..        Secured via.        Insertion/Infusion Method: Single Shot       Complications: none    Medication(s) Administered   Bupivacaine 0.25% PF (Infiltration) - Infiltration   5 mL - 12/4/2023 10:25:00 AM  Medication Administration Time: 12/4/2023 10:21 AM     Comments:  Diluted with PF 0.9 NS 4 ml (3ml @ each of 3 genicular sites)      FOR [facility] ([facility]) ONLY:   Pain Team Contact information: please page the Pain Team Via [employer]. Search \"Pain\". During daytime hours, please page the attending first. At night please page the resident first.      "

## 2023-12-05 ENCOUNTER — APPOINTMENT (OUTPATIENT)
Dept: PHYSICAL THERAPY | Facility: HOSPITAL | Age: 83
DRG: 468 | End: 2023-12-05
Payer: MEDICARE

## 2023-12-05 VITALS
DIASTOLIC BLOOD PRESSURE: 54 MMHG | RESPIRATION RATE: 18 BRPM | OXYGEN SATURATION: 98 % | BODY MASS INDEX: 29.94 KG/M2 | WEIGHT: 175.4 LBS | SYSTOLIC BLOOD PRESSURE: 129 MMHG | TEMPERATURE: 97.3 F | HEIGHT: 64 IN | HEART RATE: 67 BPM

## 2023-12-05 LAB
CREAT SERPL-MCNC: 1.48 MG/DL (ref 0.51–0.95)
EGFRCR SERPLBLD CKD-EPI 2021: 35 ML/MIN/1.73M2
GLUCOSE SERPL-MCNC: 156 MG/DL (ref 70–99)
HGB BLD-MCNC: 9.8 G/DL (ref 11.7–15.7)

## 2023-12-05 PROCEDURE — 82565 ASSAY OF CREATININE: CPT | Performed by: ORTHOPAEDIC SURGERY

## 2023-12-05 PROCEDURE — 250N000013 HC RX MED GY IP 250 OP 250 PS 637

## 2023-12-05 PROCEDURE — 250N000011 HC RX IP 250 OP 636: Mod: JZ

## 2023-12-05 PROCEDURE — 36415 COLL VENOUS BLD VENIPUNCTURE: CPT

## 2023-12-05 PROCEDURE — 99232 SBSQ HOSP IP/OBS MODERATE 35: CPT | Mod: 24 | Performed by: HOSPITALIST

## 2023-12-05 PROCEDURE — 97161 PT EVAL LOW COMPLEX 20 MIN: CPT | Mod: GP

## 2023-12-05 PROCEDURE — 82947 ASSAY GLUCOSE BLOOD QUANT: CPT | Performed by: ORTHOPAEDIC SURGERY

## 2023-12-05 PROCEDURE — 97110 THERAPEUTIC EXERCISES: CPT | Mod: GP

## 2023-12-05 PROCEDURE — 85018 HEMOGLOBIN: CPT

## 2023-12-05 RX ORDER — METOPROLOL SUCCINATE 25 MG/1
25 TABLET, EXTENDED RELEASE ORAL EVERY MORNING
COMMUNITY
Start: 2023-10-16

## 2023-12-05 RX ORDER — ACETAMINOPHEN 500 MG
1000 TABLET ORAL AT BEDTIME
Status: ON HOLD | COMMUNITY
End: 2023-12-05

## 2023-12-05 RX ORDER — CEFADROXIL 500 MG/1
500 CAPSULE ORAL 2 TIMES DAILY
Qty: 20 CAPSULE | Refills: 0 | Status: SHIPPED | OUTPATIENT
Start: 2023-12-05 | End: 2023-12-15

## 2023-12-05 RX ADMIN — OXYCODONE HYDROCHLORIDE 10 MG: 5 TABLET ORAL at 00:09

## 2023-12-05 RX ADMIN — Medication 500 MG: at 00:08

## 2023-12-05 RX ADMIN — OXYCODONE HYDROCHLORIDE 10 MG: 5 TABLET ORAL at 04:55

## 2023-12-05 RX ADMIN — SENNOSIDES AND DOCUSATE SODIUM 1 TABLET: 8.6; 5 TABLET ORAL at 09:02

## 2023-12-05 RX ADMIN — OXYCODONE HYDROCHLORIDE 10 MG: 5 TABLET ORAL at 09:02

## 2023-12-05 RX ADMIN — ACETAMINOPHEN 975 MG: 325 TABLET, FILM COATED ORAL at 09:02

## 2023-12-05 RX ADMIN — METOPROLOL TARTRATE 25 MG: 25 TABLET, FILM COATED ORAL at 09:02

## 2023-12-05 RX ADMIN — ACETAMINOPHEN 975 MG: 325 TABLET, FILM COATED ORAL at 00:09

## 2023-12-05 RX ADMIN — CEFAZOLIN SODIUM 2 G: 2 INJECTION, SOLUTION INTRAVENOUS at 04:30

## 2023-12-05 RX ADMIN — SENNOSIDES AND DOCUSATE SODIUM 1 TABLET: 8.6; 5 TABLET ORAL at 00:09

## 2023-12-05 RX ADMIN — POLYETHYLENE GLYCOL 3350 17 G: 17 POWDER, FOR SOLUTION ORAL at 09:02

## 2023-12-05 RX ADMIN — OXYBUTYNIN CHLORIDE 5 MG: 5 TABLET, EXTENDED RELEASE ORAL at 09:02

## 2023-12-05 RX ADMIN — LISINOPRIL 5 MG: 5 TABLET ORAL at 09:02

## 2023-12-05 RX ADMIN — HYDROCHLOROTHIAZIDE 25 MG: 25 TABLET ORAL at 09:02

## 2023-12-05 ASSESSMENT — ACTIVITIES OF DAILY LIVING (ADL)
ADLS_ACUITY_SCORE: 31
ADLS_ACUITY_SCORE: 26
ADLS_ACUITY_SCORE: 26
ADLS_ACUITY_SCORE: 31

## 2023-12-05 NOTE — PROGRESS NOTES
This writer met w/pt to verify face sheet information. Pt was alert et oriented et states she has talked to Dr. Parry about home PT. Pt request change in PCP to Huan Franco. No other concerns at this time.    Medical record and Edu Score reviewed. Participated in interdisciplinary rounds.  No apparent needs noted at this time. Care Transitions will remain available if needs arise.

## 2023-12-05 NOTE — PLAN OF CARE
Occupational Therapy: Orders received. Chart reviewed and discussed with care team.? Occupational Therapy not indicated per Physical Therapy.? Defer discharge recommendations to Physical Therapy.? Will complete orders.

## 2023-12-05 NOTE — PROGRESS NOTES
"[facility]    Medicine Progress Note - Hospitalist Service    Date of Admission:  12/4/2023    Assessment & Plan   Tenisha Nelson is a 83 year old female who was admitted on 12/4/2023. I was asked to see the patient for medical management.    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  - home meds   -doac     CKD, stage 3a  - daily labs    Chronic anticoagulation  - apixaban     HTN  -home meds                Diet: Advance Diet as Tolerated: Regular Diet Adult  Discharge Instruction - Regular Diet Adult    DVT Prophylaxis: DOAC  Lopez Catheter: Not present  Lines: None     Cardiac Monitoring: None  Code Status: Full Code      Clinically Significant Risk Factors Present on Admission               # Drug Induced Coagulation Defect: home medication list includes an anticoagulant medication    # Hypertension: Noted on problem list      # Obesity: Estimated body mass index is 30.11 kg/m  as calculated from the following:    Height as of this encounter: 1.626 m (5' 4\").    Weight as of this encounter: 79.6 kg (175 lb 6.4 oz).              Disposition Plan      Expected Discharge Date: 12/05/2023,  3:00 PM    Destination: home              Martin Zuñiga DO  Hospitalist Service  [facility]  Securely message with zappit (more info)  Text page via [employer] Paging/Directory   ______________________________________________________________________    Interval History    Patient stable after surgery. No pain   Off oxygen     Physical Exam   Vital Signs: Temp: 97.3  F (36.3  C) Temp src: Tympanic BP: 129/54 Pulse: 67   Resp: 18 SpO2: 98 % O2 Device: None (Room air) Oxygen Delivery: 1 LPM  Weight: 175 lbs 6.4 oz    Physical Exam  HENT:      Right Ear: External ear normal.      Left Ear: External ear normal.      Mouth/Throat:      Pharynx: Oropharynx is clear.   Cardiovascular:      " Rate and Rhythm: Normal rate.   Pulmonary:      Effort: Pulmonary effort is normal.   Abdominal:      General: Abdomen is flat.   Musculoskeletal:         General: Normal range of motion.   Skin:     General: Skin is warm.   Neurological:      Mental Status: She is alert. Mental status is at baseline.         Medical Decision Making       40 MINUTES SPENT BY ME on the date of service doing chart review, history, exam, documentation & further activities per the note.      Data     I have personally reviewed the following data over the past 24 hrs:    N/A  \   9.8 (L)   / N/A     N/A N/A N/A /  156 (H)   N/A N/A 1.48 (H) \       Imaging results reviewed over the past 24 hrs:   Recent Results (from the past 24 hour(s))   XR Knee Port Right 1/2 Views    Narrative    PROCEDURE:  XR KNEE PORT RIGHT 1/2 VIEWS    HISTORY: Post-Op Total Knee.    COMPARISON:  None.    TECHNIQUE:  2 views right knee.    FINDINGS:  Postoperative changes of interval right knee arthroplasty  with long intramedullary stems are seen. No periprosthetic fracture is  identified. Heterotopic ossification is seen superior to the right  patella. Subcutaneous air and skin staples are seen.       Impression    IMPRESSION: Interval right knee arthroplasty.      KAM PARIS MD         SYSTEM ID:  [id number]

## 2023-12-05 NOTE — PLAN OF CARE
Patient discharged at 10:55 AM via wheel chair accompanied by daughter and staff. Prescriptions sent to patients preferred pharmacy. All belongings sent with patient.     Discharge instructions reviewed with Patient and daughter. Listed belongings gathered and returned to patient. yes    Patient discharged to Home.       Surgical Patient   Surgical Procedures during stay: yes  Did patient receive discharge instruction on wound care and recognition of infection symptoms? Yes    MISC  Follow up appointment made:  Yes  Home medications returned to patient: N/A  Patient reports pain was well managed at discharge: Yes

## 2023-12-05 NOTE — PLAN OF CARE
"Reason for hospital stay:  Right Total Knee Arthroplasty     Most recent vitals: /54 (BP Location: Right arm, Patient Position: Semi-Sykes's, Cuff Size: Adult Regular)   Pulse 76   Temp 97.3  F (36.3  C) (Tympanic)   Resp 16   Ht 1.626 m (5' 4\")   Wt 79.6 kg (175 lb 6.4 oz)   SpO2 (!) 91%   BMI 30.11 kg/m      Patient A+O x4 - calls appropriately and makes needs known. Rt knee surgical dressing remains clean, dry, and intact. CMS remains intact to RLE. Gave PRN dilaudid and PRN oxy for right knee pain with effective pain relief. Placed on 1 LPM of O2 overnight while asleep to maintain sats greater than 92%. Ambulates to bathroom and in hallway with assist of 1, walker, and gait belt. Voiding without difficulty and passing flatus. Tolerating regular diet and drinking adequate fluids. IV saline locked. RLE with heel suspended with pillow under calf all night. Rest of VS and assessments as charted in flow sheets.       Face to face report given with opportunity to observe patient.    Report given to Isra Lopez RN   12/5/2023  7:13 AM      "

## 2023-12-05 NOTE — DISCHARGE INSTRUCTIONS
You have a follow up appointment to see Dr. Parry on December 19th at 11:15 AM at [facility] in [place].  If you have any questions regarding this appointment, please call [phone]

## 2023-12-05 NOTE — DISCHARGE SUMMARY
Date of Admission: 11/4/23    Date of Discharge: 11/5/23    Admitting Physician: Vito Parry MD    Consultations: Hospitalist Service    Admitting Diagnosis: Failed Right Total Knee Arthroplasty    Discharge Diagnosis: Failed Right Total Knee Arthroplasty    Surgical Procedures Performed: Revision Right TKA    Hospital Complications: None    Discharge Medications: No Changes to home meds; Oxycodone 5-10 mg po every 4 hours added for pain control; Cefodroxil for abx    Discharge Disposition: Home    Discharge Diet: As at home    Discharge Activity: WBAT Right Lower Extremity    Hospital Course:   The patient underwent a Revision Right TKA.  There were no intraoperative or immediate post operative complications.  Once stable in the PACU they were transferred to the hospital floor where they remained for the remainder of the hospital stay.  Pain management transitioned from IV to oral pain medications.  Physical/Occupational Therapy was consulted for early mobility and gait training as well as ADL training.  DVT prophylaxis was initiated on POD #1.  Vital signs and hemoglobin remained stable. The Hospitalist team was consulted for management of medical co-morbidities.  At time of discharge the patient was medically stable and cleared by Therapy for safe discharge home.        Follow-Up: 10-14 days [facility]    Questions: Call [phone] or [phone]

## 2023-12-05 NOTE — PROGRESS NOTES
12/05/23 1400   Appointment Info   Signing Clinician's Name / Credentials (PT) Isabelle Mart DPT   Living Environment   People in Home alone  (staying with children)   Current Living Arrangements house   Home Accessibility stairs to enter home   Number of Stairs, Main Entrance 4   Stair Railings, Main Entrance railings safe and in good condition   Transportation Anticipated family or friend will provide   Self-Care   Usual Activity Tolerance good   Current Activity Tolerance moderate   Equipment Currently Used at Home none   Fall history within last six months no   General Information   Onset of Illness/Injury or Date of Surgery 12/04/23   Referring Physician Dr. Parry   Patient/Family Therapy Goals Statement (PT) Home   Pertinent History of Current Problem (include personal factors and/or comorbidities that impact the POC) Pt is s/p R TKA   Existing Precautions/Restrictions no known precautions/restrictions   Weight-Bearing Status - LLE weight-bearing as tolerated   Weight-Bearing Status - RLE weight-bearing as tolerated   Cognition   Affect/Mental Status (Cognition) WFL   Orientation Status (Cognition) oriented x 4   Follows Commands (Cognition) WFL   Pain Assessment   Patient Currently in Pain Yes, see Vital Sign flowsheet   Integumentary/Edema   Integumentary/Edema no deficits were identifed   Posture    Posture Not impaired   Range of Motion (ROM)   Range of Motion ROM deficits secondary to pain;ROM deficits secondary to swelling   ROM Comment PROM 10-90 degrees   Strength (Manual Muscle Testing)   Strength (Manual Muscle Testing) Able to perform R SLR   Bed Mobility   Bed Mobility no deficits identified   Transfers   Transfers sit-stand transfer   Sit-Stand Transfer   Sit-Stand [place] (Transfers) contact guard   Assistive Device (Sit-Stand Transfers) walker, front-wheeled   Gait/Stairs (Locomotion)   [place] Level (Gait) contact guard   Assistive Device (Gait) walker, front-wheeled   Distance  in Feet (Gait) 150'   Pattern (Gait) step-through   Deviations/Abnormal Patterns (Gait) right sided deviations   Right Sided Gait Deviations heel strike decreased   Negotiation (Stairs) number of steps;handrail location   Handrail Location (Stairs) both sides   Number of Steps (Stairs) 5   Balance   Balance no deficits were identified   Sensory Examination   Sensory Perception patient reports no sensory changes   Coordination   Coordination no deficits were identified   Clinical Impression   Criteria for Skilled Therapeutic Intervention Yes, treatment indicated   PT Diagnosis (PT) s/p TKA   Influenced by the following impairments Pain, impaired gait, impaired ROM   Functional limitations due to impairments Decreased activity tolerance   Clinical Presentation (PT Evaluation Complexity) stable   Clinical Presentation Rationale Clinical judgement   Clinical Decision Making (Complexity) low complexity   Planned Therapy Interventions (PT) bed mobility training;gait training;home exercise program;joint mobilization;neuromuscular re-education;patient/family education;ROM (range of motion);stair training;strengthening;stretching;transfer training;progressive activity/exercise   Risk & Benefits of therapy have been explained care plan/treatment goals reviewed;evaluation/treatment results reviewed;risks/benefits reviewed;current/potential barriers reviewed;participants voiced agreement with care plan;participants included;patient   Clinical Impression Comments PT evaluation completed s/p R TKA. Pt tolerated session well today c no functional concerns c d/c home. Recommend continued home PT once d/c home   PT Total Evaluation Time   PT Eval, Low Complexity Minutes (91074) 10   Physical Therapy Goals   PT Frequency One time eval and treatment only   PT Predicted Duration/Target Date for Goal Attainment 12/05/23   PT Goals PT Goal 1   PT: Goal 1 I c HEP   Interventions   Interventions Quick Adds Therapeutic Procedure    Therapeutic Procedure/Exercise   Ther. Procedure: strength, endurance, ROM, flexibillity Minutes (13941) 25   Symptoms Noted During/After Treatment fatigue   Treatment Detail/Skilled Intervention Provided pt education regarding importance of mobility and performing HEP especially focusing on knee flexion ROM. Completed SLR x 10, heel slides x 10, LAQ x10. Provided pt with walker and fit her for walker. Provided education on icing   PT Discharge Planning   PT Plan Home today   PT Discharge Recommendation (DC Rec) home with home care physical therapy;home with outpatient physical therapy   PT Rationale for DC Rec See clinical impression/comments   PT Brief overview of current status 150' FWW and CGA   PT Equipment Needed at Discharge walker, rolling   Total Session Time   Timed Code Treatment Minutes 25   Total Session Time (sum of timed and untimed services) 35

## 2023-12-05 NOTE — MEDICATION SCRIBE - ADMISSION MEDICATION HISTORY
Medication Scribe Admission Medication History    Admission medication history is complete. The information provided in this note is only as accurate as the sources available at the time of the update.    Information Source(s): Patient and CareEverywhere/SureScripts via in-person    Pertinent Information:   Patient manages her own medications and is a good historian.     Changes made to PTA medication list:  Added: metoprolol succinate  Deleted: metoprolol tartrate, lidocaine patches  Changed: apap from 325 mg to 500 mg, updated time of day pt takes    Medication Affordability:  Not including over the counter (OTC) medications, was there a time in the past 3 months when you did not take your medications as prescribed because of cost?: No    Allergies reviewed with patient and updates made in EHR: yes    Medication History Completed By: Lin Rivas 12/5/2023 7:37 AM    PTA Med List   Medication Sig Last Dose    acetaminophen (TYLENOL) 325 MG tablet Take 2 tablets (650 mg) by mouth every 4 hours as needed for other (mild pain)     acetaminophen (TYLENOL) 500 MG tablet Take 1,000 mg by mouth at bedtime  . Limit acetaminophen to 4000 mg per day from all sources. 11/30/2023 at HS    hydrochlorothiazide (HYDRODIURIL) 25 MG tablet Take 25 mg by mouth every morning 12/2/2023 at AM    lisinopril (ZESTRIL) 5 MG tablet Take 5 mg by mouth every morning Past Week at AM    Magnesium Oxide 250 MG TABS Take 1 tablet by mouth at bedtime Past Week at HS    metoprolol succinate ER (TOPROL XL) 25 MG 24 hr tablet Take 25 mg by mouth every morning Past Week at AM    oxyBUTYnin ER (DITROPAN XL) 5 MG 24 hr tablet Take 5 mg by mouth every morning . Do not crush. Past Week at AM    oxyCODONE (ROXICODONE) 5 MG tablet Take 1-2 tablets (5-10 mg) by mouth every 4 hours as needed for moderate to severe pain

## 2023-12-05 NOTE — CONSULTS
[facility]    Hospitalist Consultation    Date of Admission:  12/4/2023    Assessment & Plan   Tenisha Nelson is a 83 year old female who was admitted on 12/4/2023. I was asked to see the patient for medical management.    Principal Problem:    Status post revision of total replacement of right knee    Assessment: stable.    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     Other medical problems will be monitored  - impaired fasting glucose  - pericardial effusion history  - respiratory failure    DVT Prophylaxis: DOAC  Code Status: Full Code    Disposition: Expected discharge in a few days once OK to be discharged per ortho.    Erin Cano MD    Reason for Consult   Reason for consult: I was asked by SELIN Smith MD to evaluate this patient for post op chronic medical problem management. .    Primary Care Physician   Leena Perry    Chief Complaint   No complaints.     History is obtained from the patient and electronic health record    History of Present Illness   Tenisha Nelson is a 83 year old female who presents with h/o PE, chronic anticoagulation, CKD stage 3a, HTN, respiratory failure, DJD, bilateral total knee who underwent R revision total knee arthroplasty. No acute events during surgery or post. Seen in bed. Denies any complaints. Critical points of PMH, HPI noted. Chart reviewed, patient examined.       Past Medical History    I have reviewed this patient's medical history and updated it with pertinent information if needed.   Past Medical History:   Diagnosis Date    PONV (postoperative nausea and vomiting)    PE  DJD  CKD, stage 3  HTN  Dyslipidemia    Past Surgical History   I have reviewed this patient's surgical history and updated it with  pertinent information if needed.  Past Surgical History:   Procedure Laterality Date    COLONOSCOPY  2011    [place]; polyps    COLONOSCOPY N/A 6/28/2019    Procedure: COLONOSCOPY with Polypectomy;  Surgeon: Wero Woodard MD;  Location: [facility]    REPAIR HAMMER TOE Left 5/9/2016    Procedure: REPAIR HAMMER TOE;  Surgeon: Celso De La Cruz DPM;  Location: [facility]    REPAIR HAMMER TOE Right 8/1/2016    Procedure: REPAIR HAMMER TOE;  Surgeon: Celso De La Cruz DPM;  Location: [facility] OR       Prior to Admission Medications   Prior to Admission Medications   Prescriptions Last Dose Informant Patient Reported? Taking?   Lidocaine (LIDOCARE) 4 % Patch Unknown  No Yes   Sig: Place 1 patch onto the skin every 24 hours To prevent lidocaine toxicity, patient should be patch free for 12 hrs daily.   Magnesium Oxide 250 MG TABS Past Week  Yes Yes   Sig: Take 1 tablet by mouth at bedtime   acetaminophen (TYLENOL) 325 MG tablet Past Week  Yes Yes   Sig: Take 650 mg by mouth   apixaban ANTICOAGULANT (ELIQUIS) 5 MG tablet 12/1/2023  Yes No   Sig: Take 5 mg by mouth   hydrochlorothiazide (HYDRODIURIL) 25 MG tablet 12/2/2023  Yes Yes   Sig: Take 25 mg by mouth daily   lisinopril (ZESTRIL) 5 MG tablet Past Week  Yes Yes   Sig: Take 5 mg by mouth daily   metoprolol tartrate (LOPRESSOR) 25 MG tablet Past Week  Yes Yes   Sig: Take 25 mg by mouth daily   oxyBUTYnin ER (DITROPAN XL) 5 MG 24 hr tablet Past Week  Yes Yes   Sig: Take 5 mg by mouth daily      Facility-Administered Medications: None     Allergies   No Known Allergies    Social History   I have reviewed this patient's social history and updated it with pertinent information if needed. Tenisha Nelson  reports that she quit smoking about 34 years ago. Her smoking use included cigarettes. She has quit using smokeless tobacco. She reports that she does not currently use alcohol. She reports that she does not currently use drugs.    Family History   I have reviewed this patient's family  history and updated it with pertinent information if needed.   Family History   Problem Relation Age of Onset    Colon Cancer Sister         diagnosed at age 62    Leukemia Brother     Liver Cancer Brother     Bone Cancer Brother     Heart Disease Brother        Review of Systems   10 points of ROS obtained. See pertinent positives and negatives listed in HPI. The rest is negative.     Physical Exam:   Constitutional: well developed, well nourished, not in distress.   HEENT: atraumatic, normocephalic. MMM.  Neck: supple, no masses, no bruits, no LAD.  CVS: regular, S1, S2, no S3, no rubs or gallops.  RESPIRATORY: symmetrical respirations, no accessory muscle use, clear vesicular BS bilaterally  GI: soft, not tender, not distended, no HSM, no pulsatile masses. BS present.   : no CVA tenderness.   MS: normal muscle bulk. Post R total knee Dressings dry. Toes warm, sensations intact.   NEUROLOGICAL: Follows commands, no focal deficit. DTR 2/4 both upper and Left lower extremities. Right LE is post op.   PSYCHIATRIC: awake, oriented x 3, calm, cooperative  SKIN: warm, well perfused.         Data   -Data reviewed today: All pertinent laboratory and imaging results from this encounter were reviewed. I personally reviewed no images or EKG's today.  Recent Labs   Lab 12/04/23  1008   *       Recent Results (from the past 24 hour(s))   XR Knee Port Right 1/2 Views    Narrative    PROCEDURE:  XR KNEE PORT RIGHT 1/2 VIEWS    HISTORY: Post-Op Total Knee.    COMPARISON:  None.    TECHNIQUE:  2 views right knee.    FINDINGS:  Postoperative changes of interval right knee arthroplasty  with long intramedullary stems are seen. No periprosthetic fracture is  identified. Heterotopic ossification is seen superior to the right  patella. Subcutaneous air and skin staples are seen.       Impression    IMPRESSION: Interval right knee arthroplasty.      KAM PARIS MD         SYSTEM ID:  [id number]

## 2023-12-09 LAB
BACTERIA TISS BX CULT: ABNORMAL
BACTERIA TISS BX CULT: NORMAL

## 2023-12-11 ENCOUNTER — DOCUMENTATION ONLY (OUTPATIENT)
Dept: OTHER | Facility: CLINIC | Age: 83
End: 2023-12-11

## 2023-12-12 LAB
BACTERIA FLD CULT: NO GROWTH
BACTERIA FLD CULT: NORMAL

## 2024-01-02 LAB
BACTERIA SNV CULT: NO GROWTH
BACTERIA TISS BX CULT: NO GROWTH

## 2025-08-26 ENCOUNTER — HOSPITAL ENCOUNTER (INPATIENT)
Facility: HOSPITAL | Age: 85
LOS: 1 days | Discharge: HOME | End: 2025-08-27
Attending: FAMILY MEDICINE | Admitting: HOSPITALIST
Payer: MEDICARE

## 2025-08-26 ENCOUNTER — APPOINTMENT (OUTPATIENT)
Dept: CARDIOLOGY | Facility: HOSPITAL | Age: 85
DRG: 125 | End: 2025-08-26
Attending: FAMILY MEDICINE
Payer: MEDICARE

## 2025-08-26 ENCOUNTER — APPOINTMENT (OUTPATIENT)
Dept: CT IMAGING | Facility: HOSPITAL | Age: 85
End: 2025-08-26
Attending: FAMILY MEDICINE
Payer: MEDICARE

## 2025-08-26 ENCOUNTER — APPOINTMENT (OUTPATIENT)
Dept: MRI IMAGING | Facility: HOSPITAL | Age: 85
End: 2025-08-26
Attending: FAMILY MEDICINE
Payer: MEDICARE

## 2025-08-26 PROBLEM — I31.39 PERICARDIAL EFFUSION: Status: ACTIVE | Noted: 2021-06-12

## 2025-08-26 PROBLEM — I26.94 MULTIPLE SUBSEGMENTAL PULMONARY EMBOLI WITHOUT ACUTE COR PULMONALE (H): Status: ACTIVE | Noted: 2021-06-12

## 2025-08-26 PROBLEM — I31.4 CARDIAC TAMPONADE: Status: ACTIVE | Noted: 2021-06-16

## 2025-08-26 PROBLEM — H34.232 RETINAL ARTERY BRANCH OCCLUSION OF LEFT EYE: Status: ACTIVE | Noted: 2025-08-26

## 2025-08-26 PROBLEM — Z86.711 HISTORY OF PULMONARY EMBOLISM: Status: ACTIVE | Noted: 2023-12-04

## 2025-08-26 PROCEDURE — 93306 TTE W/DOPPLER COMPLETE: CPT | Mod: 26 | Performed by: INTERNAL MEDICINE

## 2025-08-26 PROCEDURE — 93306 TTE W/DOPPLER COMPLETE: CPT

## 2025-08-26 ASSESSMENT — ENCOUNTER SYMPTOMS
MUSCULOSKELETAL NEGATIVE: 1
SHORTNESS OF BREATH: 0
ABDOMINAL PAIN: 0
COUGH: 0
PSYCHIATRIC NEGATIVE: 1
ALLERGIC/IMMUNOLOGIC NEGATIVE: 1
FEVER: 0
NEUROLOGICAL NEGATIVE: 1
HEMATOLOGIC/LYMPHATIC NEGATIVE: 1

## 2025-08-26 ASSESSMENT — ACTIVITIES OF DAILY LIVING (ADL)
ADLS_ACUITY_SCORE: 59
TOILETING_ISSUES: NO
ADLS_ACUITY_SCORE: 59
WERE_AUXILIARY_AIDS_OFFERED?: NO
WEAR_GLASSES_OR_BLIND: NO
DESCRIBE_HEARING_LOSS: BILATERAL HEARING LOSS
DOING_ERRANDS_INDEPENDENTLY_DIFFICULTY: YES
ADLS_ACUITY_SCORE: 39
CONCENTRATING,_REMEMBERING_OR_MAKING_DECISIONS_DIFFICULTY: NO
ADLS_ACUITY_SCORE: 59
FALL_HISTORY_WITHIN_LAST_SIX_MONTHS: NO
ADLS_ACUITY_SCORE: 59
ADLS_ACUITY_SCORE: 39
ADLS_ACUITY_SCORE: 59
DRESSING/BATHING_DIFFICULTY: NO
CHANGE_IN_FUNCTIONAL_STATUS_SINCE_ONSET_OF_CURRENT_ILLNESS/INJURY: NO
THE_FOLLOWING_AIDS_WERE_PROVIDED;: PATIENT DECLINED OFFER OF AUXILIARY AIDS
ADLS_ACUITY_SCORE: 59
ADLS_ACUITY_SCORE: 39
PATIENT'S_PREFERRED_MEANS_OF_COMMUNICATION: VERBAL
HEARING_DIFFICULTY_OR_DEAF: YES
WALKING_OR_CLIMBING_STAIRS_DIFFICULTY: NO
DIFFICULTY_EATING/SWALLOWING: NO
ADLS_ACUITY_SCORE: 59
ADLS_ACUITY_SCORE: 59
DIFFICULTY_COMMUNICATING: NO

## 2025-08-26 ASSESSMENT — COLUMBIA-SUICIDE SEVERITY RATING SCALE - C-SSRS
1. IN THE PAST MONTH, HAVE YOU WISHED YOU WERE DEAD OR WISHED YOU COULD GO TO SLEEP AND NOT WAKE UP?: NO
2. HAVE YOU ACTUALLY HAD ANY THOUGHTS OF KILLING YOURSELF IN THE PAST MONTH?: NO
6. HAVE YOU EVER DONE ANYTHING, STARTED TO DO ANYTHING, OR PREPARED TO DO ANYTHING TO END YOUR LIFE?: NO

## 2025-08-27 ENCOUNTER — APPOINTMENT (OUTPATIENT)
Dept: OCCUPATIONAL THERAPY | Facility: HOSPITAL | Age: 85
DRG: 125 | End: 2025-08-27
Attending: HOSPITALIST
Payer: MEDICARE

## 2025-08-27 ENCOUNTER — APPOINTMENT (OUTPATIENT)
Dept: PHYSICAL THERAPY | Facility: HOSPITAL | Age: 85
DRG: 125 | End: 2025-08-27
Attending: HOSPITALIST
Payer: MEDICARE

## 2025-08-27 ENCOUNTER — APPOINTMENT (OUTPATIENT)
Dept: CT IMAGING | Facility: HOSPITAL | Age: 85
DRG: 125 | End: 2025-08-27
Attending: PHYSICIAN ASSISTANT
Payer: MEDICARE

## 2025-08-27 PROCEDURE — 71260 CT THORAX DX C+: CPT | Mod: 26 | Performed by: RADIOLOGY

## 2025-08-27 PROCEDURE — 74177 CT ABD & PELVIS W/CONTRAST: CPT | Mod: 26 | Performed by: RADIOLOGY

## 2025-08-27 PROCEDURE — 74177 CT ABD & PELVIS W/CONTRAST: CPT

## 2025-08-27 ASSESSMENT — ACTIVITIES OF DAILY LIVING (ADL)
ADLS_ACUITY_SCORE: 39
PREVIOUS_RESPONSIBILITIES: MEAL PREP;HOUSEKEEPING;LAUNDRY;SHOPPING;MEDICATION MANAGEMENT;FINANCES;DRIVING
ADLS_ACUITY_SCORE: 39

## (undated) DEVICE — TUBING-SUCTION 20FT

## (undated) DEVICE — PULSE LAVAGE IRRIGATION SYSTEM 0210-114-100

## (undated) DEVICE — STAPLER-SKIN 35 WIDE STAPLES

## (undated) DEVICE — PAD POSITIONING KNEE SELF ADHESIVE MPR100211

## (undated) DEVICE — SENSOR-OXISENSOR II ADULT

## (undated) DEVICE — TRAP-POLYP E-TRAP

## (undated) DEVICE — PACK BASIN SET UP SUTCNBSBBA

## (undated) DEVICE — SU VICRYL 1 CT-1 CR 8X18" J741D

## (undated) DEVICE — PREP CHLORAPREP 26ML TINTED HI-LITE ORANGE 930815

## (undated) DEVICE — IRRIGATION-H2O 1000ML

## (undated) DEVICE — CONTAINER SPECIMEN 4OZ STERILE 17099

## (undated) DEVICE — BONE CEMENT MIXING SYSTEM REVOLUTION

## (undated) DEVICE — BIN-MINI, MAXI, MICRO DRIVER BLADES BIN

## (undated) DEVICE — CAUTERY PAD-POLYHESIVE II ADULT

## (undated) DEVICE — WATER STERILE 1000ML STERILE UROMATIC 2B-71-14

## (undated) DEVICE — BLADE SAGITTAL DUAL CUT 25MM X 100MM X 1.27MM BR4125-127-100

## (undated) DEVICE — CONNECTOR-ERBEFLO 2 PORT

## (undated) DEVICE — CAST PADDING 6IN COTTON WEBRIL STERILE 2554

## (undated) DEVICE — SOL NACL 0.9% IRRIG 3000ML BAG 2B7477

## (undated) DEVICE — SLEEVE SCD EXPRESS KNEE LENGTH MED 9529

## (undated) DEVICE — SOL NACL 0.9% IRRIG 1000ML BOTTLE 2F7124

## (undated) DEVICE — PACK KNEE CUSTOM SOP32TKMB7

## (undated) DEVICE — CLEANSER-IRRISEPT OR W/CHG

## (undated) DEVICE — CANISTER-SUCTION 2000CC

## (undated) DEVICE — EYE PREP BETADINE 5% SOLUTION 30ML 0065-0411-30

## (undated) DEVICE — ESU GROUND PAD ADULT W/CORD E7507

## (undated) DEVICE — SUCTION TUBE YANKAUR K61

## (undated) DEVICE — GLOVE PROTEXIS POWDER FREE 8.0 ORTHOPEDIC 2D73ET80

## (undated) DEVICE — COVER LT HANDLE 2/PK 5160-2FG

## (undated) DEVICE — SNARE-ROTATABLE 20MM MINI OVAL

## (undated) DEVICE — DRSG AQUACEL AG 3.5X14"  422607

## (undated) DEVICE — Device

## (undated) DEVICE — DRAPE POUCH INSTRUMENT 1018

## (undated) DEVICE — SUTURE VICRYL+ 2-0 27IN CT-1 UND VCP259H

## (undated) DEVICE — SU VICRYL 0 CP-1 27" J467H

## (undated) DEVICE — BLADE SAW RECIP STRK 70X12.5X0.80MM 0277-096-277

## (undated) DEVICE — LABEL STERILE PREPRINTED FOR OR FRRH01-2M

## (undated) RX ORDER — FENTANYL CITRATE-0.9 % NACL/PF 10 MCG/ML
PLASTIC BAG, INJECTION (ML) INTRAVENOUS
Status: DISPENSED
Start: 2023-12-04

## (undated) RX ORDER — ONDANSETRON 2 MG/ML
INJECTION INTRAMUSCULAR; INTRAVENOUS
Status: DISPENSED
Start: 2023-12-04

## (undated) RX ORDER — FENTANYL CITRATE 50 UG/ML
INJECTION, SOLUTION INTRAMUSCULAR; INTRAVENOUS
Status: DISPENSED
Start: 2023-12-04

## (undated) RX ORDER — PROPOFOL 10 MG/ML
INJECTION, EMULSION INTRAVENOUS
Status: DISPENSED
Start: 2023-12-04

## (undated) RX ORDER — DEXMEDETOMIDINE HYDROCHLORIDE 100 UG/ML
INJECTION, SOLUTION INTRAVENOUS
Status: DISPENSED
Start: 2023-12-04

## (undated) RX ORDER — PROPOFOL 10 MG/ML
INJECTION, EMULSION INTRAVENOUS
Status: DISPENSED
Start: 2019-06-28

## (undated) RX ORDER — GLYCOPYRROLATE 0.2 MG/ML
INJECTION, SOLUTION INTRAMUSCULAR; INTRAVENOUS
Status: DISPENSED
Start: 2023-12-04

## (undated) RX ORDER — DEXAMETHASONE SODIUM PHOSPHATE 10 MG/ML
INJECTION, SOLUTION INTRAMUSCULAR; INTRAVENOUS
Status: DISPENSED
Start: 2023-12-04

## (undated) RX ORDER — BUPIVACAINE HYDROCHLORIDE 2.5 MG/ML
INJECTION, SOLUTION EPIDURAL; INFILTRATION; INTRACAUDAL
Status: DISPENSED
Start: 2023-12-04

## (undated) RX ORDER — LIDOCAINE HYDROCHLORIDE 20 MG/ML
INJECTION, SOLUTION EPIDURAL; INFILTRATION; INTRACAUDAL; PERINEURAL
Status: DISPENSED
Start: 2019-06-28